# Patient Record
Sex: FEMALE | Race: WHITE | Employment: UNEMPLOYED | URBAN - METROPOLITAN AREA
[De-identification: names, ages, dates, MRNs, and addresses within clinical notes are randomized per-mention and may not be internally consistent; named-entity substitution may affect disease eponyms.]

---

## 2024-03-04 ASSESSMENT — PATIENT HEALTH QUESTIONNAIRE - PHQ9
8. MOVING OR SPEAKING SO SLOWLY THAT OTHER PEOPLE COULD HAVE NOTICED. OR THE OPPOSITE, BEING SO FIGETY OR RESTLESS THAT YOU HAVE BEEN MOVING AROUND A LOT MORE THAN USUAL: 0
7. TROUBLE CONCENTRATING ON THINGS, SUCH AS READING THE NEWSPAPER OR WATCHING TELEVISION: 2
SUM OF ALL RESPONSES TO PHQ QUESTIONS 1-9: 7
8. MOVING OR SPEAKING SO SLOWLY THAT OTHER PEOPLE COULD HAVE NOTICED. OR THE OPPOSITE - BEING SO FIDGETY OR RESTLESS THAT YOU HAVE BEEN MOVING AROUND A LOT MORE THAN USUAL: NOT AT ALL
2. FEELING DOWN, DEPRESSED OR HOPELESS: 1
6. FEELING BAD ABOUT YOURSELF - OR THAT YOU ARE A FAILURE OR HAVE LET YOURSELF OR YOUR FAMILY DOWN: 1
6. FEELING BAD ABOUT YOURSELF - OR THAT YOU ARE A FAILURE OR HAVE LET YOURSELF OR YOUR FAMILY DOWN: SEVERAL DAYS
4. FEELING TIRED OR HAVING LITTLE ENERGY: MORE THAN HALF THE DAYS
10. IF YOU CHECKED OFF ANY PROBLEMS, HOW DIFFICULT HAVE THESE PROBLEMS MADE IT FOR YOU TO DO YOUR WORK, TAKE CARE OF THINGS AT HOME, OR GET ALONG WITH OTHER PEOPLE: 0
5. POOR APPETITE OR OVEREATING: 1
1. LITTLE INTEREST OR PLEASURE IN DOING THINGS: 0
3. TROUBLE FALLING OR STAYING ASLEEP: NOT AT ALL
10. IF YOU CHECKED OFF ANY PROBLEMS, HOW DIFFICULT HAVE THESE PROBLEMS MADE IT FOR YOU TO DO YOUR WORK, TAKE CARE OF THINGS AT HOME, OR GET ALONG WITH OTHER PEOPLE: NOT DIFFICULT AT ALL
SUM OF ALL RESPONSES TO PHQ QUESTIONS 1-9: 7
SUM OF ALL RESPONSES TO PHQ QUESTIONS 1-9: 7
1. LITTLE INTEREST OR PLEASURE IN DOING THINGS: NOT AT ALL
SUM OF ALL RESPONSES TO PHQ QUESTIONS 1-9: 7
2. FEELING DOWN, DEPRESSED OR HOPELESS: SEVERAL DAYS
7. TROUBLE CONCENTRATING ON THINGS, SUCH AS READING THE NEWSPAPER OR WATCHING TELEVISION: MORE THAN HALF THE DAYS
5. POOR APPETITE OR OVEREATING: SEVERAL DAYS
3. TROUBLE FALLING OR STAYING ASLEEP: 0
4. FEELING TIRED OR HAVING LITTLE ENERGY: 2
9. THOUGHTS THAT YOU WOULD BE BETTER OFF DEAD, OR OF HURTING YOURSELF: NOT AT ALL
9. THOUGHTS THAT YOU WOULD BE BETTER OFF DEAD, OR OF HURTING YOURSELF: 0
SUM OF ALL RESPONSES TO PHQ9 QUESTIONS 1 & 2: 1
SUM OF ALL RESPONSES TO PHQ QUESTIONS 1-9: 7

## 2024-03-05 ENCOUNTER — OFFICE VISIT (OUTPATIENT)
Dept: OBGYN CLINIC | Age: 24
End: 2024-03-05
Payer: COMMERCIAL

## 2024-03-05 ENCOUNTER — HOSPITAL ENCOUNTER (OUTPATIENT)
Age: 24
Setting detail: SPECIMEN
Discharge: HOME | End: 2024-03-05

## 2024-03-05 VITALS
DIASTOLIC BLOOD PRESSURE: 60 MMHG | BODY MASS INDEX: 18 KG/M2 | WEIGHT: 112 LBS | HEIGHT: 66 IN | SYSTOLIC BLOOD PRESSURE: 112 MMHG

## 2024-03-05 DIAGNOSIS — N93.9 VAGINAL BLEEDING: ICD-10-CM

## 2024-03-05 DIAGNOSIS — N91.2 AMENORRHEA: ICD-10-CM

## 2024-03-05 DIAGNOSIS — N92.6 MISSED MENSES: ICD-10-CM

## 2024-03-05 DIAGNOSIS — Z86.19 HISTORY OF HERPES SIMPLEX INFECTION: ICD-10-CM

## 2024-03-05 DIAGNOSIS — N91.2 AMENORRHEA: Primary | ICD-10-CM

## 2024-03-05 DIAGNOSIS — F32.A ANXIETY AND DEPRESSION: ICD-10-CM

## 2024-03-05 DIAGNOSIS — F41.9 ANXIETY AND DEPRESSION: ICD-10-CM

## 2024-03-05 LAB
AMPHET UR QL SCN: NEGATIVE
BARBITURATES UR QL SCN: NEGATIVE
BENZODIAZ UR QL: NEGATIVE
CANNABINOIDS UR QL SCN: NEGATIVE
COCAINE UR QL SCN: NEGATIVE
FENTANYL UR QL: NEGATIVE
METHADONE UR QL: NEGATIVE
OPIATES UR QL SCN: NEGATIVE
OXYCODONE UR QL SCN: NEGATIVE
PCP UR QL SCN: NEGATIVE
TEST INFORMATION: NORMAL

## 2024-03-05 PROCEDURE — G8427 DOCREV CUR MEDS BY ELIG CLIN: HCPCS | Performed by: NURSE PRACTITIONER

## 2024-03-05 PROCEDURE — 1036F TOBACCO NON-USER: CPT | Performed by: NURSE PRACTITIONER

## 2024-03-05 PROCEDURE — G8484 FLU IMMUNIZE NO ADMIN: HCPCS | Performed by: NURSE PRACTITIONER

## 2024-03-05 PROCEDURE — 99214 OFFICE O/P EST MOD 30 MIN: CPT | Performed by: NURSE PRACTITIONER

## 2024-03-05 PROCEDURE — 81025 URINE PREGNANCY TEST: CPT | Performed by: NURSE PRACTITIONER

## 2024-03-05 PROCEDURE — G8419 CALC BMI OUT NRM PARAM NOF/U: HCPCS | Performed by: NURSE PRACTITIONER

## 2024-03-05 ASSESSMENT — ANXIETY QUESTIONNAIRES
7. FEELING AFRAID AS IF SOMETHING AWFUL MIGHT HAPPEN: 3
5. BEING SO RESTLESS THAT IT IS HARD TO SIT STILL: 1
GAD7 TOTAL SCORE: 14
2. NOT BEING ABLE TO STOP OR CONTROL WORRYING: 2
1. FEELING NERVOUS, ANXIOUS, OR ON EDGE: 3
IF YOU CHECKED OFF ANY PROBLEMS ON THIS QUESTIONNAIRE, HOW DIFFICULT HAVE THESE PROBLEMS MADE IT FOR YOU TO DO YOUR WORK, TAKE CARE OF THINGS AT HOME, OR GET ALONG WITH OTHER PEOPLE: SOMEWHAT DIFFICULT
6. BECOMING EASILY ANNOYED OR IRRITABLE: 2
4. TROUBLE RELAXING: 1
3. WORRYING TOO MUCH ABOUT DIFFERENT THINGS: 2

## 2024-03-05 NOTE — PROGRESS NOTES
[facility] [address]  [place] [id number]  Dept: [phone]    Missed Menses Intake    Subjective:    Patient Name:Venu Ramsey  Patient Age: 23 y.o.  Date of Visit: 3/5/2024    Venu Ramsey arrives for missed menses visit.   Venu Ramsey had a positive pregnancy test   Venu Ramsey does not have concerns.   Planned Pregnancy: Yes  Partner/FOB name: Josh  No LMP recorded., Regular menses: Yes  Estimate gestation age of LMP: 8w1d  Estimated due date based on LMP: 10/14/24  Patient Occupation: n/a    OBGYN History:   Date of Last Pap Smear:  normal   Number of Pregnancies: 2  Number of Live Births: 1  [x] Vaginal Birth  []  Birth  [] Vaginal Birth after  delivery ()  [] History of High Risk Pregnancy(ies)  [] History of Birth Complications  [] Hx of infant with NICU stay    Past Medical History  Diabetes: No  Anxiety: Yes  Depression: Yes  Bipolar: No  High Blood Pressure:No  Stroke: No  Seizure: No  Deep Vein Thrombosis: No  Preeclampsia: No  Gestational Diabetes:No  Gestational Hypertension:No  Postpartum Hemorrhage: No  Bleeding Disorder: No  Sexually Transmitted Infections: No  Genital Herpes: Yes, only cold sores  History of chicken pox/varicella vaccine: Yes  Daily Medications: Yes  PNV     Past Family History (first degree relative)  Family history of blood clots: No  Family history of diabetes:No  Family history of factor V: No  Family history (mother/sister) of preeclampsia in a previous pregnancy: No    Early 1 Hour Glucose Screening  [] BMI 30 or greater (if marked, positive screen)  Risk Factors (need more than 1 if BMI less than 30)  [] Physical inactivity   [] First degree relative with diabetes  [] High- risk race or ethnicity (, , ,  American, )  [] Have previously given birth to an infant weighing 4,000g or more  [] 
referral will need placed prior to scheduling.   Reviewed ACOG recommendations for carrier screening- CF, SMA, fragile X, Hemoglobinopathies, and additional carrier screening for [employer] decent recommendations reviewed.:  The test we use includes genetic conditions by which American College of Obstetricians and Gynecologists recommend be screened for which includes Cystic fibrosis, fragile X syndrome, spinal muscular atrophy and thalassemias, but also includes additional carrier screening of disorders for a total of 14, that is backed by American College of Obstetricians and Gynecologists.   Patient was given handouts with decision by OB History visit   - Reviewed routine tests done at new OB visit and patient verbally consents to urine drug screen and HIV screening at New OB Visit  - 1 hour glucola needed at OB history: No  She denies tobacco use. The patient was counseled on the risks of tobacco abuse, both maternal and fetal. She was instructed to stop smoking if currently using tobacco. Morbidity, mortality, and cessation programs were reviewed. The risks include but are not limited to increased risks of  labor,  delivery, premature rupture of membranes, intrauterine growth restriction, intrauterine fetal demise and abruptio placenta. Secondary smoke risks were also reviewed. Increases in cancer, respiratory problems, and sudden infant death syndrome were reviewed as well.  - She was instructed to call with any concerns or worsening of any symptoms  - She will return for New OB intake visit  High Risk Factors Identified for this pregnancy:   Hx anxiety/depression  - Elevated EPDS 21 and KALA 7- 14 3/5/2024   - no medications, no longer seeing therapist  - referrals provided 3/5/2024   -Denies any  suicidal ideations at this time had noted thought about it hardly ever but states that was years ago not now or recently Denies any suicidal/homicidal ideatiosn or plans plans or delusions or

## 2024-03-06 LAB
CANDIDA SPECIES: NEGATIVE
CHLAMYDIA DNA UR QL NAA+PROBE: NEGATIVE
GARDNERELLA VAGINALIS: NEGATIVE
N GONORRHOEA DNA UR QL NAA+PROBE: NEGATIVE
SOURCE: NORMAL
SPECIMEN DESCRIPTION: NORMAL
TRICHOMONAS: NEGATIVE

## 2024-03-18 NOTE — PROGRESS NOTES
Patient or partner has Hepatitis C No     Live with Someone with or Exposed to TB? Yes FOB NEG TB TEST    History of STD/GC/Chlamydia/HPV/Syphilis? No     Patient or Partner has Hx of Genital Herpes? No     History of Chickenpox No     History of MRSA No     Risk of Toxoplasmosis Yes 3 CATS    Risk of CMV No     List of other infections      Rash or Viral Illness Since LMP? No     Additional comments      Patient or partner has Hepatitis B No         Previous Birth History:   Vaginal Birth: yes   Birth: no  Any previous birth complications: yes-CIRCUMVALLATE PLACENTA  History of hemorrhage during/after birth: no  History of bleeding disorders: No    High Risk Factor Screening for this Pregnancy:  Age greater than 35 at delivery: No  History of  delivery: no  History of bleeding disorders: No  Objection to receiving blood products: No  History of diabetes (gestational or outside of pregnancy): No, On medications: No  Screening for pregestational diabetes:   BMI greater than 30: No. If Yes, need early glucola  BMI greater than 25 ( Americans greater than 23): No If Yes, need 1 more risk factor.   Physical inactivity: No  First-degree relative with diabetes: No  High-risk race of ethnicity (eg, , , ,  American, ): No  Previously given birth to an infant weighing 3ynp85os (4000g) or more: No  History of hypertension: No  HDL < 35mg/dL and/or a triglyceride level greater than 250 mg/dL: No HDL 50  23  History of polycystic ovarian syndrome: No  A1c greater than or equal to 5.7%: NA    Assessment/Plan:    No pregnancy checklist tasks were completed during this visit, and no tasks are pending completion.       Pregnancy at 10 WEEKS 1 DAY   She was counseled on office policies. She was educated about the anticipated course of prenatal care and routine prenatal labs.   Patient has consented to HIV testing and urine drug screen:

## 2024-03-19 ENCOUNTER — INITIAL PRENATAL (OUTPATIENT)
Dept: OBGYN CLINIC | Age: 24
End: 2024-03-19
Payer: COMMERCIAL

## 2024-03-19 ENCOUNTER — PATIENT MESSAGE (OUTPATIENT)
Dept: PRIMARY CARE CLINIC | Age: 24
End: 2024-03-19

## 2024-03-19 ENCOUNTER — PATIENT MESSAGE (OUTPATIENT)
Dept: OBGYN CLINIC | Age: 24
End: 2024-03-19

## 2024-03-19 VITALS
WEIGHT: 113.2 LBS | SYSTOLIC BLOOD PRESSURE: 110 MMHG | BODY MASS INDEX: 18.55 KG/M2 | HEART RATE: 100 BPM | DIASTOLIC BLOOD PRESSURE: 62 MMHG

## 2024-03-19 DIAGNOSIS — Z3A.10 10 WEEKS GESTATION OF PREGNANCY: Primary | ICD-10-CM

## 2024-03-19 DIAGNOSIS — B00.1 COLD SORE: Primary | ICD-10-CM

## 2024-03-19 PROCEDURE — G8427 DOCREV CUR MEDS BY ELIG CLIN: HCPCS | Performed by: NURSE PRACTITIONER

## 2024-03-19 PROCEDURE — 0500F INITIAL PRENATAL CARE VISIT: CPT | Performed by: NURSE PRACTITIONER

## 2024-03-19 PROCEDURE — H1000 PRENATAL CARE ATRISK ASSESSM: HCPCS | Performed by: NURSE PRACTITIONER

## 2024-03-19 PROCEDURE — G8420 CALC BMI NORM PARAMETERS: HCPCS | Performed by: NURSE PRACTITIONER

## 2024-03-19 SDOH — ECONOMIC STABILITY: INCOME INSECURITY: IN THE LAST 12 MONTHS, WAS THERE A TIME WHEN YOU WERE NOT ABLE TO PAY THE MORTGAGE OR RENT ON TIME?: NO

## 2024-03-19 SDOH — ECONOMIC STABILITY: TRANSPORTATION INSECURITY
IN THE PAST 12 MONTHS, HAS THE LACK OF TRANSPORTATION KEPT YOU FROM MEDICAL APPOINTMENTS OR FROM GETTING MEDICATIONS?: NO

## 2024-03-19 SDOH — ECONOMIC STABILITY: HOUSING INSECURITY
IN THE LAST 12 MONTHS, WAS THERE A TIME WHEN YOU DID NOT HAVE A STEADY PLACE TO SLEEP OR SLEPT IN A SHELTER (INCLUDING NOW)?: NO

## 2024-03-19 SDOH — ECONOMIC STABILITY: FOOD INSECURITY: WITHIN THE PAST 12 MONTHS, YOU WORRIED THAT YOUR FOOD WOULD RUN OUT BEFORE YOU GOT MONEY TO BUY MORE.: NEVER TRUE

## 2024-03-19 SDOH — ECONOMIC STABILITY: FOOD INSECURITY: WITHIN THE PAST 12 MONTHS, THE FOOD YOU BOUGHT JUST DIDN'T LAST AND YOU DIDN'T HAVE MONEY TO GET MORE.: NEVER TRUE

## 2024-03-19 SDOH — ECONOMIC STABILITY: TRANSPORTATION INSECURITY
IN THE PAST 12 MONTHS, HAS LACK OF TRANSPORTATION KEPT YOU FROM MEETINGS, WORK, OR FROM GETTING THINGS NEEDED FOR DAILY LIVING?: NO

## 2024-03-19 SDOH — ECONOMIC STABILITY: HOUSING INSECURITY: IN THE LAST 12 MONTHS, HOW MANY PLACES HAVE YOU LIVED?: 1

## 2024-03-19 ASSESSMENT — SOCIAL DETERMINANTS OF HEALTH (SDOH)
WITHIN THE LAST YEAR, HAVE YOU BEEN AFRAID OF YOUR PARTNER OR EX-PARTNER?: NO
HOW HARD IS IT FOR YOU TO PAY FOR THE VERY BASICS LIKE FOOD, HOUSING, MEDICAL CARE, AND HEATING?: NOT HARD AT ALL
WITHIN THE LAST YEAR, HAVE TO BEEN RAPED OR FORCED TO HAVE ANY KIND OF SEXUAL ACTIVITY BY YOUR PARTNER OR EX-PARTNER?: NO
WITHIN THE LAST YEAR, HAVE YOU BEEN HUMILIATED OR EMOTIONALLY ABUSED IN OTHER WAYS BY YOUR PARTNER OR EX-PARTNER?: NO
WITHIN THE LAST YEAR, HAVE YOU BEEN KICKED, HIT, SLAPPED, OR OTHERWISE PHYSICALLY HURT BY YOUR PARTNER OR EX-PARTNER?: NO

## 2024-03-19 ASSESSMENT — ANXIETY QUESTIONNAIRES
6. BECOMING EASILY ANNOYED OR IRRITABLE: SEVERAL DAYS
GAD7 TOTAL SCORE: 5
IF YOU CHECKED OFF ANY PROBLEMS ON THIS QUESTIONNAIRE, HOW DIFFICULT HAVE THESE PROBLEMS MADE IT FOR YOU TO DO YOUR WORK, TAKE CARE OF THINGS AT HOME, OR GET ALONG WITH OTHER PEOPLE: NOT DIFFICULT AT ALL
1. FEELING NERVOUS, ANXIOUS, OR ON EDGE: SEVERAL DAYS
3. WORRYING TOO MUCH ABOUT DIFFERENT THINGS: SEVERAL DAYS
7. FEELING AFRAID AS IF SOMETHING AWFUL MIGHT HAPPEN: NOT AT ALL
4. TROUBLE RELAXING: SEVERAL DAYS
2. NOT BEING ABLE TO STOP OR CONTROL WORRYING: SEVERAL DAYS
5. BEING SO RESTLESS THAT IT IS HARD TO SIT STILL: NOT AT ALL

## 2024-03-20 ENCOUNTER — E-VISIT (OUTPATIENT)
Dept: PRIMARY CARE CLINIC | Age: 24
End: 2024-03-20
Payer: COMMERCIAL

## 2024-03-20 DIAGNOSIS — B00.1 COLD SORE: Primary | ICD-10-CM

## 2024-03-20 PROCEDURE — 99421 OL DIG E/M SVC 5-10 MIN: CPT | Performed by: NURSE PRACTITIONER

## 2024-03-20 NOTE — TELEPHONE ENCOUNTER
From: Venu Ramsey  To: Cindi Johnson  Sent: 3/19/2024 5:06 PM EDT  Subject: Valtrex    Hello, I think I am getting a cold sore and was wanting a prescription for valycyclover. A lot of them if possible so that I don't have to ask for a new prescription every time I get one amlnd they can last me awhile. Thank you.

## 2024-03-21 RX ORDER — VALACYCLOVIR HYDROCHLORIDE 1 G/1
1000 TABLET, FILM COATED ORAL DAILY
Qty: 5 TABLET | Refills: 0 | Status: SHIPPED | OUTPATIENT
Start: 2024-03-21 | End: 2024-03-21

## 2024-03-21 RX ORDER — VALACYCLOVIR HYDROCHLORIDE 1 G/1
1000 TABLET, FILM COATED ORAL 2 TIMES DAILY
Qty: 5 TABLET | Refills: 3 | Status: SHIPPED | OUTPATIENT
Start: 2024-03-21

## 2024-03-21 NOTE — TELEPHONE ENCOUNTER
From: Venu Ramsey  To: Chela Bonilla  Sent: 3/19/2024 5:05 PM EDT  Subject: Valtrex    Hello, I think I am starting to get a cold sore amd was wanting a prescription for valycyclover. Preferably a lot of them if possible as it makes it easier when I do get a cold sore to already have them as opposed to asking for more every time I get one, thank you.

## 2024-03-21 NOTE — PROGRESS NOTES
Venu Ramsey (2000) initiated an asynchronous digital communication through [employer].    HPI: per patient questionnaire     Exam: not applicable    Diagnoses and all orders for this visit:    Cold sore  New, recommend discussing with OB prior to treatment, pt made aware. Valtrex rx on standby pending OB approval.     -     Discontinue: valACYclovir (VALTREX) 1 g tablet; Take 1 tablet by mouth daily for 5 days      Time: EV1 - 5-10 minutes were spent on the digital evaluation and management of this patient.    Cindi Johnson, PATRICIA - CNP

## 2024-04-16 ENCOUNTER — ROUTINE PRENATAL (OUTPATIENT)
Dept: OBGYN CLINIC | Age: 24
End: 2024-04-16

## 2024-04-16 VITALS — BODY MASS INDEX: 18.68 KG/M2 | WEIGHT: 114 LBS | SYSTOLIC BLOOD PRESSURE: 118 MMHG | DIASTOLIC BLOOD PRESSURE: 70 MMHG

## 2024-04-16 DIAGNOSIS — Z3A.14 14 WEEKS GESTATION OF PREGNANCY: ICD-10-CM

## 2024-04-16 DIAGNOSIS — Z34.90 NORMAL REPEAT PREGNANCY, ANTEPARTUM: Primary | ICD-10-CM

## 2024-04-16 PROCEDURE — 0502F SUBSEQUENT PRENATAL CARE: CPT | Performed by: ADVANCED PRACTICE MIDWIFE

## 2024-04-16 NOTE — PROGRESS NOTES
SUBJECTIVE:    Venu is here for her return OB visit. denies concerns today.   She denies  feeling fetal movement.  She denies vaginal bleeding.  She denies vaginal discharge.  She denies leaking of fluid.  She denies uterine cramping.  She denies  nausea and/or vomiting.    OBJECTIVE:  Blood pressure 118/70, weight 51.7 kg (114 lb), last menstrual period 01/08/2024, not currently breastfeeding.    Total weight gain: 0 kg (0 lb)      ASSESSMENT/PLAN:  IUP @ 14+1 weeks  S=D    Normal Repeat Pregnancy  Due date is based on LMP and confirmed with 10w0d early dating ultrasound  Patient's prenatal labs are not completed.  Patient's blood type A positive and rhogam is not indicated in the pregnancy.   Early glucola indicated: no  Patient Declined genetic screening.   Anatomy scan scheduled 5/28/24   Total weight gain in pregnancy reviewed: Yes      2. 14 weeks gestation of pregnancy  - reviewed warning signs         She was counseled regarding all of the above:    Return in about 4 weeks (around 5/14/2024) for Return OB.    The patient, Venu Ramsey was seen for 25 minutes were spent on this encounter on the date of service by the provider.         Electronically Signed By: PATRICIA Quesada CNM

## 2024-05-14 ENCOUNTER — ROUTINE PRENATAL (OUTPATIENT)
Dept: OBGYN CLINIC | Age: 24
End: 2024-05-14

## 2024-05-14 ENCOUNTER — HOSPITAL ENCOUNTER (OUTPATIENT)
Age: 24
Setting detail: SPECIMEN
Discharge: HOME | End: 2024-05-14

## 2024-05-14 VITALS — BODY MASS INDEX: 19.83 KG/M2 | WEIGHT: 121 LBS | SYSTOLIC BLOOD PRESSURE: 112 MMHG | DIASTOLIC BLOOD PRESSURE: 60 MMHG

## 2024-05-14 DIAGNOSIS — N93.9 VAGINAL BLEEDING: ICD-10-CM

## 2024-05-14 DIAGNOSIS — Z3A.18 18 WEEKS GESTATION OF PREGNANCY: ICD-10-CM

## 2024-05-14 DIAGNOSIS — Z34.90 NORMAL REPEAT PREGNANCY, ANTEPARTUM: Primary | ICD-10-CM

## 2024-05-14 DIAGNOSIS — Z3A.10 10 WEEKS GESTATION OF PREGNANCY: ICD-10-CM

## 2024-05-14 DIAGNOSIS — N92.6 MISSED MENSES: ICD-10-CM

## 2024-05-14 LAB
ABO + RH BLD: NORMAL
BASOPHILS # BLD: 0.05 K/UL (ref 0–0.2)
BASOPHILS NFR BLD: 1 % (ref 0–2)
BILIRUB UR QL STRIP: NEGATIVE
BLOOD GROUP ANTIBODIES SERPL: NEGATIVE
CLARITY UR: CLEAR
COLOR UR: YELLOW
COMMENT: NORMAL
EOSINOPHIL # BLD: 0.17 K/UL (ref 0–0.44)
EOSINOPHILS RELATIVE PERCENT: 2 % (ref 1–4)
ERYTHROCYTE [DISTWIDTH] IN BLOOD BY AUTOMATED COUNT: 12.8 % (ref 11.8–14.4)
GLUCOSE UR STRIP-MCNC: NEGATIVE MG/DL
HBV SURFACE AG SERPL QL IA: NONREACTIVE
HCT VFR BLD AUTO: 39.9 % (ref 36.3–47.1)
HCV AB SERPL QL IA: NONREACTIVE
HGB BLD-MCNC: 12.6 G/DL (ref 11.9–15.1)
HGB UR QL STRIP.AUTO: NEGATIVE
HIV 1+2 AB+HIV1 P24 AG SERPL QL IA: NONREACTIVE
IMM GRANULOCYTES # BLD AUTO: 0.07 K/UL (ref 0–0.3)
IMM GRANULOCYTES NFR BLD: 1 %
KETONES UR STRIP-MCNC: NEGATIVE MG/DL
LEUKOCYTE ESTERASE UR QL STRIP: NEGATIVE
LYMPHOCYTES NFR BLD: 2.5 K/UL (ref 1.1–3.7)
LYMPHOCYTES RELATIVE PERCENT: 24 % (ref 24–43)
MCH RBC QN AUTO: 31.1 PG (ref 25.2–33.5)
MCHC RBC AUTO-ENTMCNC: 31.6 G/DL (ref 28.4–34.8)
MCV RBC AUTO: 98.5 FL (ref 82.6–102.9)
MONOCYTES NFR BLD: 0.85 K/UL (ref 0.1–1.2)
MONOCYTES NFR BLD: 8 % (ref 3–12)
NEUTROPHILS NFR BLD: 64 % (ref 36–65)
NEUTS SEG NFR BLD: 6.84 K/UL (ref 1.5–8.1)
NITRITE UR QL STRIP: NEGATIVE
NRBC BLD-RTO: 0 PER 100 WBC
PH UR STRIP: 7.5 [PH] (ref 5–8)
PLATELET # BLD AUTO: 204 K/UL (ref 138–453)
PMV BLD AUTO: 11.5 FL (ref 8.1–13.5)
PROT UR STRIP-MCNC: NEGATIVE MG/DL
RBC # BLD AUTO: 4.05 M/UL (ref 3.95–5.11)
RUBV IGG SERPL QL IA: 83.3 IU/ML
SP GR UR STRIP: 1 (ref 1–1.03)
T PALLIDUM AB SER QL IA: NONREACTIVE
UROBILINOGEN UR STRIP-ACNC: NORMAL EU/DL (ref 0–1)
WBC OTHER # BLD: 10.5 K/UL (ref 3.5–11.3)

## 2024-05-14 PROCEDURE — 0502F SUBSEQUENT PRENATAL CARE: CPT | Performed by: ADVANCED PRACTICE MIDWIFE

## 2024-05-14 NOTE — PROGRESS NOTES
SUBJECTIVE:    Venu is here for her return OB visit. denies concerns today.   She reports  feeling fetal movement.  She denies vaginal bleeding.  She denies vaginal discharge.  She denies leaking of fluid.  She denies uterine cramping.  She denies  nausea and/or vomiting.    OBJECTIVE:  Blood pressure 112/60, weight 54.9 kg (121 lb), last menstrual period 01/08/2024, not currently breastfeeding.    Total weight gain: 3.175 kg (7 lb)        ASSESSMENT/PLAN:  IUP @ 18+1 weeks  S=D    Normal Repeat Pregnancy  Due date is based on LMP and confirmed with 10w0d early dating ultrasound  Patient's prenatal labs are not completed.  Patient's blood type A positive and rhogam is not indicated in the pregnancy.   Early glucola indicated: no  Patient Declined genetic screening.   Anatomy scan scheduled 5/28/24   Total weight gain in pregnancy reviewed: Yes  Fetal movement was reviewed.      2. 18 weeks gestation of pregnancy  - Reviewed warning sings  - Reviewed prenatal labs and reports is going to do after her appointment today        She was counseled regarding all of the above:    Return in about 4 weeks (around 6/11/2024) for Return OB.    The patient, Venu Ramsey was seen for 25 minutes were spent on this encounter on the date of service by the provider.       Electronically Signed By: PATRICIA Quesada CNM

## 2024-05-15 LAB
CHLAMYDIA DNA UR QL NAA+PROBE: NEGATIVE
MICROORGANISM SPEC CULT: NORMAL
N GONORRHOEA DNA UR QL NAA+PROBE: NEGATIVE
SPECIMEN DESCRIPTION: NORMAL
SPECIMEN DESCRIPTION: NORMAL
VZV IGG SER QL IA: 1.1

## 2024-05-15 RX ORDER — VALACYCLOVIR HYDROCHLORIDE 1 G/1
1000 TABLET, FILM COATED ORAL 2 TIMES DAILY
Qty: 5 TABLET | Refills: 3 | Status: SHIPPED | OUTPATIENT
Start: 2024-05-15

## 2024-05-15 NOTE — TELEPHONE ENCOUNTER
Pt is only using this for outbreaks for her cold sores- being pregnant she gets them frequently and was wondering if she can either have refills or have a 30 day supply

## 2024-05-28 ENCOUNTER — ROUTINE PRENATAL (OUTPATIENT)
Dept: PERINATAL CARE | Age: 24
End: 2024-05-28
Payer: COMMERCIAL

## 2024-05-28 VITALS
HEIGHT: 66 IN | HEART RATE: 92 BPM | SYSTOLIC BLOOD PRESSURE: 110 MMHG | TEMPERATURE: 97.3 F | RESPIRATION RATE: 16 BRPM | DIASTOLIC BLOOD PRESSURE: 68 MMHG | BODY MASS INDEX: 19.93 KG/M2 | WEIGHT: 124 LBS

## 2024-05-28 DIAGNOSIS — O44.22 PLACENTA MARGINALIS IN SECOND TRIMESTER: Primary | ICD-10-CM

## 2024-05-28 DIAGNOSIS — O26.12 LOW WEIGHT GAIN DURING PREGNANCY IN SECOND TRIMESTER: ICD-10-CM

## 2024-05-28 DIAGNOSIS — Z36.86 ENCOUNTER FOR SCREENING FOR RISK OF PRE-TERM LABOR: ICD-10-CM

## 2024-05-28 DIAGNOSIS — Z3A.20 20 WEEKS GESTATION OF PREGNANCY: ICD-10-CM

## 2024-05-28 DIAGNOSIS — O44.40 LOW IMPLANTATION OF PLACENTA WITHOUT HEMORRHAGE: ICD-10-CM

## 2024-05-28 DIAGNOSIS — O09.899 SHORT INTERVAL BETWEEN PREGNANCIES COMPLICATING PREGNANCY, ANTEPARTUM: ICD-10-CM

## 2024-05-28 PROCEDURE — 76817 TRANSVAGINAL US OBSTETRIC: CPT | Performed by: OBSTETRICS & GYNECOLOGY

## 2024-05-28 PROCEDURE — G8420 CALC BMI NORM PARAMETERS: HCPCS | Performed by: OBSTETRICS & GYNECOLOGY

## 2024-05-28 PROCEDURE — G8427 DOCREV CUR MEDS BY ELIG CLIN: HCPCS | Performed by: OBSTETRICS & GYNECOLOGY

## 2024-05-28 PROCEDURE — 99244 OFF/OP CNSLTJ NEW/EST MOD 40: CPT | Performed by: OBSTETRICS & GYNECOLOGY

## 2024-05-28 PROCEDURE — 76811 OB US DETAILED SNGL FETUS: CPT | Performed by: OBSTETRICS & GYNECOLOGY

## 2024-06-12 ENCOUNTER — ROUTINE PRENATAL (OUTPATIENT)
Dept: OBGYN CLINIC | Age: 24
End: 2024-06-12

## 2024-06-12 VITALS
BODY MASS INDEX: 20.89 KG/M2 | SYSTOLIC BLOOD PRESSURE: 124 MMHG | WEIGHT: 130 LBS | HEIGHT: 66 IN | DIASTOLIC BLOOD PRESSURE: 72 MMHG

## 2024-06-12 DIAGNOSIS — O43.199 MARGINAL INSERTION OF UMBILICAL CORD AFFECTING MANAGEMENT OF MOTHER: ICD-10-CM

## 2024-06-12 DIAGNOSIS — Z3A.22 22 WEEKS GESTATION OF PREGNANCY: ICD-10-CM

## 2024-06-12 DIAGNOSIS — Z34.90 NORMAL REPEAT PREGNANCY, ANTEPARTUM: Primary | ICD-10-CM

## 2024-06-12 DIAGNOSIS — O44.40 LOW-LYING PLACENTA: ICD-10-CM

## 2024-06-12 PROCEDURE — 0502F SUBSEQUENT PRENATAL CARE: CPT | Performed by: ADVANCED PRACTICE MIDWIFE

## 2024-06-12 NOTE — PROGRESS NOTES
SUBJECTIVE:    Venu is here for her return OB visit. denies concerns today.   She reports  feeling fetal movement.  She denies vaginal bleeding.  She denies vaginal discharge.  She denies leaking of fluid.  She denies uterine cramping.  She denies  nausea and/or vomiting.    OBJECTIVE:  Blood pressure 124/72, height 1.664 m (5' 5.5\"), weight 59 kg (130 lb), last menstrual period 2024, not currently breastfeeding.    Total weight gain: 7.258 kg (16 lb)      ASSESSMENT/PLAN:  IUP @ 22+2 weeks  S=D    High Risk Pregnancy  Due date is based on LMP and confirmed with 10w0d early dating ultrasound  Patient's prenatal labs are  completed.  Patient's blood type A positive and rhogam is not indicated in the pregnancy.   Early glucola indicated: no  Patient Declined genetic screening.   Anatomy scan scheduled 24 completed. Placenta posterior,normal cord insertion: NO Marginal Cord Insertion, cervical length 3.64 cm, low lying placenta PRESENT  and 23.1mm from cervical os, no placenta previa . F/up scheduled 24  Second trimester 24-28 week labs:   [x] Ordered 2024 PATRICIA Quesada - KEILA   Total weight gain in pregnancy reviewed: Yes  Fetal movement was reviewed.  Reviewed signs and symptoms of  labor: Yes  Reviewed signs and symptoms of preeclampsia: Yes  Reviewed signs and symptoms of stephanie hick contractions:  Yes    2. 22 weeks gestation of pregnancy  - CBC; Future  - Culture, Urine; Future  - Glucose tolerance, 1 hour; Future    3. Marginal insertion of umbilical cord affecting management of mother  - Reviewed findings on anatomy scan     4. Low-lying placenta  - Reviewed findings on anatomy scan and recommended f/up. USN with M scheduled.         She was counseled regarding all of the above:    Return in about 4 weeks (around 7/10/2024) for Return OB.    The patient, Venu Ramsey was seen for 25 minutes were spent on this encounter on the date of service by the provider.

## 2024-06-12 NOTE — PROGRESS NOTES
Chaperone for Intimate Exam  Chaperone was offered as part of the rooming process. Patient declined and agrees to continue with exam without a chaperone.  Chaperone: n.a

## 2024-07-11 DIAGNOSIS — B00.1 COLD SORE: Primary | ICD-10-CM

## 2024-07-11 RX ORDER — VALACYCLOVIR HYDROCHLORIDE 1 G/1
1000 TABLET, FILM COATED ORAL 2 TIMES DAILY
Qty: 30 TABLET | Refills: 0 | Status: SHIPPED | OUTPATIENT
Start: 2024-07-11

## 2024-07-11 NOTE — TELEPHONE ENCOUNTER
Medication Request/Refill Telephone Documentation:  Medication Requested: valtrex   Provider last filled by: marquis  Last visit: 6/12/24  Last annual/pap smear: 3/23/23  NEXT APPT:7/17/24

## 2024-07-16 ENCOUNTER — ROUTINE PRENATAL (OUTPATIENT)
Dept: OBGYN CLINIC | Age: 24
End: 2024-07-16

## 2024-07-16 VITALS — BODY MASS INDEX: 22.78 KG/M2 | SYSTOLIC BLOOD PRESSURE: 102 MMHG | WEIGHT: 139 LBS | DIASTOLIC BLOOD PRESSURE: 64 MMHG

## 2024-07-16 DIAGNOSIS — O43.199 MARGINAL INSERTION OF UMBILICAL CORD AFFECTING MANAGEMENT OF MOTHER: ICD-10-CM

## 2024-07-16 DIAGNOSIS — O09.90 HIGH RISK PREGNANCY, ANTEPARTUM: Primary | ICD-10-CM

## 2024-07-16 DIAGNOSIS — O44.40 LOW-LYING PLACENTA: ICD-10-CM

## 2024-07-16 DIAGNOSIS — R12 HEARTBURN DURING PREGNANCY, ANTEPARTUM: ICD-10-CM

## 2024-07-16 DIAGNOSIS — O26.899 HEARTBURN DURING PREGNANCY, ANTEPARTUM: ICD-10-CM

## 2024-07-16 DIAGNOSIS — Z3A.27 27 WEEKS GESTATION OF PREGNANCY: ICD-10-CM

## 2024-07-16 PROCEDURE — 0502F SUBSEQUENT PRENATAL CARE: CPT | Performed by: NURSE PRACTITIONER

## 2024-07-16 RX ORDER — OMEPRAZOLE 20 MG/1
20 CAPSULE, DELAYED RELEASE ORAL
Qty: 30 CAPSULE | Refills: 3 | Status: SHIPPED | OUTPATIENT
Start: 2024-07-16

## 2024-07-16 NOTE — PATIENT INSTRUCTIONS
After Hours Number: You can call the office [phone]  or [phone]  Call if you have:  1.  Bleeding like a period  2.  Cramps or contractions greater than 2 hours  3.  If you are leaking fluid  4.  If you've a fever greater than 100°  5.  If you feel as if baby is not moving  6. If you have continuous vomiting over 3-4 hours   Counting Your Baby's Kicks: Care Instructions  Your Care Instructions    Counting your baby's kicks is one way your doctor can tell that your baby is healthy. Most women--especially in a first pregnancy--feel their baby move for the first time between 16 and 22 weeks. The movement may feel like flutters rather than kicks. Your baby may move more at certain times of the day. When you are active, you may notice less kicking than when you are resting. At your prenatal visits, your doctor will ask whether the baby is active.  In your last trimester, your doctor may ask you to count the number of times you feel your baby move.  Follow-up care is a key part of your treatment and safety. Be sure to make and go to all appointments, and call your doctor if you are having problems. It's also a good idea to know your test results and keep a list of the medicines you take.  How do you count fetal kicks?  A common method of checking your baby's movement is to count the number of kicks or moves you feel in 1 hour. Ten movements (such as kicks, flutters, or rolls) in 1 hour are normal. Some doctors suggest that you count in the morning until you get to 10 movements. Then you can quit for that day and start again the next day.  Pick your baby's most active time of day to count. This may be any time from morning to evening.  If you do not feel 10 movements in an hour, your baby may be sleeping. Wait for the next hour and count again.  When should you call for help?  Call your doctor now or seek immediate medical care if:    You noticed that your baby has stopped moving or is moving much less than

## 2024-07-16 NOTE — PROGRESS NOTES
no  Discharge: no   Dysuria: no  Nausea: no  Heartburn: yes - taking max dose tums states took prilosec before and helped declined wanting pepcid  Epigastric pain: no  Contractions: no  Leakage of fluid: no  + fetal movement     1. High risk pregnancy, antepartum      2. 27 weeks gestation of pregnancy  States completing cbc, 1 hr GTT and urine culture next week     3. Low-lying placenta  4. Marginal insertion of umbilical cord affecting management of mother  Marginal Cord Insertion, cervical length 3.64 cm, low lying placenta PRESENT  and 23.1mm from cervical os, no placenta previa .  Follow up 24- she cancelled that follow up now scheduled 24  5. Heartburn during pregnancy, antepartum  Prilosec script sent   - omeprazole (PRILOSEC) 20 MG delayed release capsule; Take 1 capsule by mouth every morning (before breakfast)  Dispense: 30 capsule; Refill: 3       Return 4 WEEKS        PTL signs reviewed, if indicated  Kick Count Instructions given if indicated:  The patient was instructed on fetal kick counts and was given a kick sheet to complete every 8 hours. This is to begin at 28 weeks gestation. She was instructed that the baby should move at a minimum of ten times within one hour after a meal. The patient was instructed to lay down on her left side twenty minutes after eating and count movements for up to one hour with a target value of ten movements.   She was instructed to notify the office if she did not make that target after two attempts or if after any attempt there was less than four movements.  After hour numbers reviewed , along with labor signs if indicated.   Contractions/cramping between 5-7 minutes and persisting even with attempts of increased water and laying on side.   Fluid leakage, bleeding    The patient was counseled on Labor & Delivery.   Route of delivery and counseling on vaginal, operative vaginal, and  sections were completed with the risks of each to both the patient as

## 2024-08-07 ENCOUNTER — ROUTINE PRENATAL (OUTPATIENT)
Dept: OBGYN CLINIC | Age: 24
End: 2024-08-07

## 2024-08-07 VITALS — SYSTOLIC BLOOD PRESSURE: 108 MMHG | WEIGHT: 146 LBS | DIASTOLIC BLOOD PRESSURE: 62 MMHG | BODY MASS INDEX: 23.93 KG/M2

## 2024-08-07 DIAGNOSIS — Z3A.30 30 WEEKS GESTATION OF PREGNANCY: Primary | ICD-10-CM

## 2024-08-07 PROCEDURE — 0502F SUBSEQUENT PRENATAL CARE: CPT | Performed by: OBSTETRICS & GYNECOLOGY

## 2024-08-08 NOTE — PROGRESS NOTES
Reference Range:  <0.91         Not Immune  0.91-1.09     Equivocal  >1.09         Immune        ]    Insurance: [employer]    IPV bag/mug given: 3/19/2024 Lorie Abdi LPN   Genetic Information given/reviewed: 3/5/2024 Chela Bonilla CNP   1st trimester education packet given:  2nd trimester education packet given:3/19/2024 Lorie Abdi LPN   3rd trimester education packet given:      FOB Name: Josh    Last Pap Smear: 3/23/23 negative  [] Urine collected for culture   [] Negative date **   [] Positive date **  [x] UDS collected 3/5/24  [x] Gc/ct collected 3/5/24  [] Prenatal Labs completed **    Genetic Screening:  [x]Declined 3/19/2024 Lorie Abdi LPN       Carrier Screening:  [x] Declined  [x] Known negative CF/SMA/thalassemia/sickle cell in     Baby aspirin screen:  []Positive  [x]Negative    Early 1 hour GTT:  []Yes  [x] No    AFP:  []Ordered  []Completed    Anatomy scan:  [x]Referral Sent 3/19/2024 Lorie Abdi LPN   [x]Scheduled 24 w/ consult    [x]Completed Placenta posterior,normal cord insertion: NO Marginal Cord Insertion, cervical length 3.64 cm, low lying placenta PRESENT  and 23.1mm from cervical os, no placenta previa .  [x] Follow up 24- she cancelled that follow up now scheduled 24    Fetal Echo:   [] Yes  [] No    High Risk Factors:  Hx anxiety/depression  - Elevated EPDS 21 and KALA 7- 14 3/5/2024   - no medications, no longer seeing therapist  - referrals provided 3/5/2024   -Denies any  suicidal ideations at this time had noted thought about it hardly ever but states that was years ago not now or recently Denies any suicidal/homicidal ideatiosn or plans plans or delusions or hallucinations.  Hx HSV  - titers completed in   - recommend valtrex suppression therapy at 36 weeks   Short interval between pregnancies   - vaginal delivery 22  Marginal Cord Insertion  Low Lying Placenta   -23.1mm from os    [x]Placed on High Risk List 3/6/2024 Melissa Garcia

## 2024-08-20 ENCOUNTER — TELEPHONE (OUTPATIENT)
Dept: OBGYN CLINIC | Age: 24
End: 2024-08-20

## 2024-08-20 NOTE — TELEPHONE ENCOUNTER
Pt canceled 32 week appt because she is going to [facility] in the morning and only has 1 day to get errands done and it is too much to do in one day. Writer explained to pt that the MFM appt and OB appt are two different appts and she needs both but pt said she understands that but still wanted to cancel.

## 2024-08-21 ENCOUNTER — ROUTINE PRENATAL (OUTPATIENT)
Dept: PERINATAL CARE | Age: 24
End: 2024-08-21
Payer: COMMERCIAL

## 2024-08-21 VITALS
HEART RATE: 81 BPM | RESPIRATION RATE: 16 BRPM | SYSTOLIC BLOOD PRESSURE: 125 MMHG | HEIGHT: 65 IN | DIASTOLIC BLOOD PRESSURE: 67 MMHG | WEIGHT: 147 LBS | TEMPERATURE: 97.2 F | BODY MASS INDEX: 24.49 KG/M2

## 2024-08-21 DIAGNOSIS — Z3A.32 32 WEEKS GESTATION OF PREGNANCY: ICD-10-CM

## 2024-08-21 DIAGNOSIS — Z03.72 SUSPECTED PLACENTAL PROBLEM NOT FOUND: ICD-10-CM

## 2024-08-21 DIAGNOSIS — O09.899 SHORT INTERVAL BETWEEN PREGNANCIES COMPLICATING PREGNANCY, ANTEPARTUM: ICD-10-CM

## 2024-08-21 DIAGNOSIS — O44.23 PLACENTA MARGINALIS IN THIRD TRIMESTER: Primary | ICD-10-CM

## 2024-08-21 DIAGNOSIS — Z36.4 ULTRASOUND FOR ANTENATAL SCREENING FOR FETAL GROWTH RESTRICTION: ICD-10-CM

## 2024-08-21 DIAGNOSIS — O44.40 LOW IMPLANTATION OF PLACENTA WITHOUT HEMORRHAGE: ICD-10-CM

## 2024-08-21 DIAGNOSIS — Z36.3 ENCOUNTER FOR ROUTINE SCREENING FOR MALFORMATION USING ULTRASONICS: ICD-10-CM

## 2024-08-21 PROCEDURE — 76805 OB US >/= 14 WKS SNGL FETUS: CPT | Performed by: OBSTETRICS & GYNECOLOGY

## 2024-08-21 PROCEDURE — 99999 PR OFFICE/OUTPT VISIT,PROCEDURE ONLY: CPT | Performed by: OBSTETRICS & GYNECOLOGY

## 2024-08-21 PROCEDURE — 76817 TRANSVAGINAL US OBSTETRIC: CPT | Performed by: OBSTETRICS & GYNECOLOGY

## 2024-08-21 PROCEDURE — 76819 FETAL BIOPHYS PROFIL W/O NST: CPT | Performed by: OBSTETRICS & GYNECOLOGY

## 2024-08-21 PROCEDURE — 76820 UMBILICAL ARTERY ECHO: CPT | Performed by: OBSTETRICS & GYNECOLOGY

## 2024-08-21 NOTE — TELEPHONE ENCOUNTER
Okay thank you. If she needs some virtual visits to make things work we can do that as well. As long as she is physically being seen weekly for  testing. Just ensure she understands she still needs prenatal care and seen at this office as well as M is not her obstetric care providers and will not maintain routine OB care.     Dr. Bernal

## 2024-09-04 ENCOUNTER — ROUTINE PRENATAL (OUTPATIENT)
Dept: OBGYN CLINIC | Age: 24
End: 2024-09-04

## 2024-09-04 VITALS — SYSTOLIC BLOOD PRESSURE: 120 MMHG | DIASTOLIC BLOOD PRESSURE: 72 MMHG | WEIGHT: 150 LBS | BODY MASS INDEX: 24.96 KG/M2

## 2024-09-04 DIAGNOSIS — F41.1 GENERALIZED ANXIETY DISORDER: ICD-10-CM

## 2024-09-04 DIAGNOSIS — F33.1 MODERATE RECURRENT MAJOR DEPRESSION (HCC): ICD-10-CM

## 2024-09-04 DIAGNOSIS — O09.893 NONCOMPLIANT PREGNANT PATIENT IN THIRD TRIMESTER: ICD-10-CM

## 2024-09-04 DIAGNOSIS — O43.199 MARGINAL INSERTION OF UMBILICAL CORD AFFECTING MANAGEMENT OF MOTHER: ICD-10-CM

## 2024-09-04 DIAGNOSIS — Z91.199 NONCOMPLIANT PREGNANT PATIENT IN THIRD TRIMESTER: ICD-10-CM

## 2024-09-04 DIAGNOSIS — Z3A.34 34 WEEKS GESTATION OF PREGNANCY: Primary | ICD-10-CM

## 2024-09-04 DIAGNOSIS — O09.93 HIGH-RISK PREGNANCY IN THIRD TRIMESTER: ICD-10-CM
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
## 2024-09-04 PROCEDURE — 0502F SUBSEQUENT PRENATAL CARE: CPT | Performed by: STUDENT IN AN ORGANIZED HEALTH CARE EDUCATION/TRAINING PROGRAM

## 2024-09-04 RX ORDER — LANCETS 30 GAUGE
1 EACH MISCELLANEOUS 4 TIMES DAILY
Qty: 100 EACH | Refills: 2 | Status: SHIPPED | OUTPATIENT
Start: 2024-09-04

## 2024-09-04 RX ORDER — BLOOD-GLUCOSE METER
EACH MISCELLANEOUS
Qty: 1 EACH | Refills: 0 | Status: SHIPPED | OUTPATIENT
Start: 2024-09-04

## 2024-09-04 RX ORDER — ISOPROPYL ALCOHOL 700 MG/ML
CLOTH TOPICAL
Qty: 100 EACH | Refills: 3 | Status: SHIPPED | OUTPATIENT
Start: 2024-09-04

## 2024-09-04 NOTE — PROGRESS NOTES
Prenatal Visit    Venu Ramsey is a 24 y.o. female  at 34w2d    The patient was seen and evaluated. She has no complaints. There was positive fetal movements. She denies contractions, vaginal bleeding and leakage of fluid. Signs and symptoms of  labor as well as labor were reviewed. The S/S of Pre-Eclampsia were reviewed with the patient in detail. She is to report any of these if they occur. She currently denies any of these.    Discussed resident involvement in triage and labor and delivery process. Discussed call group. All questions answered. Patient verbalized understanding and agreement.     Vitals:  /72   Wt 68 kg (150 lb)   LMP 2024   BMI 24.96 kg/m²       Assessment & Plan:  Venu Ramsey is a 24 y.o. female  at 34w2d   - 28 week labs has not completed. Reminded patient again today. Discussed risks of undiagnosed gestational diabetes including abnormal fetal growth, fetal morbidity and mortality, fetal death, increased  blood sugar draws after delivery.   - Influenza and Covid vaccinations recommended per ACOG: R/B/A discussed with increased risk of both maternal and fetal morbidity and mortality in unvaccinated pregnant patients.    - TDAP and RSV vaccinations recommended per ACOG. R/B/A discussed. She understands must received RSV vaccine at pharmacy.   - Continue prenatal vitamin        FOB Name: Josh Chester Pap Smear: 3/23/23 negative  [x] Urine collected for culture   [x] Negative    [] Positive   [x] UDS collected 3/5/24 > negative  [x] Gc/ct collected 3/5/24 > negative  [x] Prenatal Labs completed     Genetic Screening:  [x]Declined 3/19/2024 Lorie Abdi LPN     Carrier Screening:  [x] Declined  [x] Known negative CF/SMA/thalassemia/sickle cell in     Baby aspirin screen:  []Positive  [x]Negative    Early 1 hour GTT:  []Yes  [x] No    AFP: declined  []Ordered  []Completed    Anatomy scan:  [x]Referral Sent 3/19/2024 Lorie Abdi LPN

## 2024-09-05 ENCOUNTER — TELEPHONE (OUTPATIENT)
Dept: OBGYN CLINIC | Age: 24
End: 2024-09-05

## 2024-09-05 NOTE — TELEPHONE ENCOUNTER
Pt is currently 34w3d and has not completed her 1 hr glucose test. She was originally going to check her blood sugars but called to let us know that they are to expensive and she is going to complete the glucose test.

## 2024-09-09 ENCOUNTER — HOSPITAL ENCOUNTER (OUTPATIENT)
Age: 24
Setting detail: SPECIMEN
Discharge: HOME | End: 2024-09-09

## 2024-09-09 DIAGNOSIS — Z3A.22 22 WEEKS GESTATION OF PREGNANCY: ICD-10-CM

## 2024-09-09 LAB
ERYTHROCYTE [DISTWIDTH] IN BLOOD BY AUTOMATED COUNT: 12.3 % (ref 11.8–14.4)
GLUCOSE 1H P 50 G GLC PO SERPL-MCNC: 119 MG/DL (ref 70–135)
GLUCOSE ADMINISTRATION: NORMAL
HCT VFR BLD AUTO: 37.6 % (ref 36.3–47.1)
HGB BLD-MCNC: 11.8 G/DL (ref 11.9–15.1)
MCH RBC QN AUTO: 29.6 PG (ref 25.2–33.5)
MCHC RBC AUTO-ENTMCNC: 31.4 G/DL (ref 28.4–34.8)
MCV RBC AUTO: 94.5 FL (ref 82.6–102.9)
NRBC BLD-RTO: 0 PER 100 WBC
PLATELET # BLD AUTO: 191 K/UL (ref 138–453)
PMV BLD AUTO: 11.2 FL (ref 8.1–13.5)
RBC # BLD AUTO: 3.98 M/UL (ref 3.95–5.11)
WBC OTHER # BLD: 9.9 K/UL (ref 3.5–11.3)

## 2024-09-10 LAB
MICROORGANISM SPEC CULT: NO GROWTH
SERVICE CMNT-IMP: NORMAL
SPECIMEN DESCRIPTION: NORMAL

## 2024-09-18 ENCOUNTER — ROUTINE PRENATAL (OUTPATIENT)
Dept: OBGYN CLINIC | Age: 24
End: 2024-09-18

## 2024-09-18 ENCOUNTER — HOSPITAL ENCOUNTER (OUTPATIENT)
Age: 24
Setting detail: SPECIMEN
Discharge: HOME | End: 2024-09-18

## 2024-09-18 VITALS — WEIGHT: 153 LBS | DIASTOLIC BLOOD PRESSURE: 62 MMHG | BODY MASS INDEX: 25.46 KG/M2 | SYSTOLIC BLOOD PRESSURE: 118 MMHG

## 2024-09-18 DIAGNOSIS — Z3A.36 36 WEEKS GESTATION OF PREGNANCY: ICD-10-CM

## 2024-09-18 DIAGNOSIS — O09.93 HIGH-RISK PREGNANCY IN THIRD TRIMESTER: ICD-10-CM

## 2024-09-18 DIAGNOSIS — Z3A.36 36 WEEKS GESTATION OF PREGNANCY: Primary | ICD-10-CM

## 2024-09-18 DIAGNOSIS — O43.193 MARGINAL INSERTION OF UMBILICAL CORD AFFECTING MANAGEMENT OF MOTHER IN THIRD TRIMESTER: ICD-10-CM

## 2024-09-18 RX ORDER — VALACYCLOVIR HYDROCHLORIDE 500 MG/1
500 TABLET, FILM COATED ORAL 2 TIMES DAILY
Qty: 60 TABLET | Refills: 1 | Status: SHIPPED | OUTPATIENT
Start: 2024-09-18

## 2024-09-21 LAB
MICROORGANISM SPEC CULT: NORMAL
SERVICE CMNT-IMP: NORMAL
SPECIMEN DESCRIPTION: NORMAL

## 2024-09-25 ENCOUNTER — ROUTINE PRENATAL (OUTPATIENT)
Dept: OBGYN CLINIC | Age: 24
End: 2024-09-25

## 2024-09-25 VITALS — BODY MASS INDEX: 25.63 KG/M2 | WEIGHT: 154 LBS | SYSTOLIC BLOOD PRESSURE: 122 MMHG | DIASTOLIC BLOOD PRESSURE: 62 MMHG

## 2024-09-25 DIAGNOSIS — Z3A.37 37 WEEKS GESTATION OF PREGNANCY: Primary | ICD-10-CM

## 2024-09-25 PROCEDURE — 0502F SUBSEQUENT PRENATAL CARE: CPT | Performed by: OBSTETRICS & GYNECOLOGY

## 2024-10-02 ENCOUNTER — ROUTINE PRENATAL (OUTPATIENT)
Dept: OBGYN CLINIC | Age: 24
End: 2024-10-02

## 2024-10-02 VITALS — BODY MASS INDEX: 26.29 KG/M2 | SYSTOLIC BLOOD PRESSURE: 112 MMHG | WEIGHT: 158 LBS | DIASTOLIC BLOOD PRESSURE: 68 MMHG

## 2024-10-02 DIAGNOSIS — Z3A.38 38 WEEKS GESTATION OF PREGNANCY: Primary | ICD-10-CM

## 2024-10-02 PROCEDURE — 0502F SUBSEQUENT PRENATAL CARE: CPT | Performed by: OBSTETRICS & GYNECOLOGY

## 2024-10-03 NOTE — PROGRESS NOTES
Venu Ramsey is a 24 y.o. female 38w3d        OB History    Para Term  AB Living   2 1 1     1   SAB IAB Ectopic Molar Multiple Live Births           0 1      # Outcome Date GA Lbr Maynor/2nd Weight Sex Type Anes PTL Lv   2 Current            1 Term 22 40w4d 02:19 :35 3.55 kg (7 lb 13.2 oz) F Vag-Spont EPI N MARCY       Vitals  BP: 112/68  Weight - Scale: 71.7 kg (158 lb)  Fundal Height (cm): 37 cm  Fetal HR: 150  Movement: Present  Presentation: Vertex      The patient was seen and evaluated. There was positive fetal movements. No contractions or leakage of fluid. Signs and symptoms of labor were reviewed.  The S/S of Pre-Eclampsia were reviewed with the patient in detail. She is to report any of these if they occur. She currently denies any of these.    The patient was instructed on fetal kick counts and was given a kick sheet to complete every 8 hours. She was instructed that the baby should move at a minimum of ten times within one hour after a meal. The patient was instructed to lay down on her left side twenty minutes after eating and count movements for up to one hour with a target value of ten movements.  She was instructed to notify the office if she did not make that target after two attempts or if after any attempt there was less than four movements.    The patient reports that the targets have been made Yes.    10/16/24 IOL 8 PM    Insurance: [employer]    IPV bag/mug given: 3/19/2024 Lorie Adbi LPN   Genetic Information given/reviewed: 3/5/2024 Chela Bonilla CNP   1st trimester education packet given:  2nd trimester education packet given:3/19/2024 Lorie Abdi LPN   3rd trimester education packet given:      FOB Name: Josh    Last Pap Smear: 3/23/23 negative  [x] Urine collected for culture   [x] Negative    [] Positive   [x] UDS collected 3/5/24 > negative  [x] Gc/ct collected 3/5/24 > negative  [x] Prenatal Labs completed     Genetic  No

## 2024-10-08 ENCOUNTER — ROUTINE PRENATAL (OUTPATIENT)
Dept: OBGYN CLINIC | Age: 24
End: 2024-10-08

## 2024-10-08 VITALS — WEIGHT: 158 LBS | SYSTOLIC BLOOD PRESSURE: 116 MMHG | DIASTOLIC BLOOD PRESSURE: 62 MMHG | BODY MASS INDEX: 26.29 KG/M2

## 2024-10-08 DIAGNOSIS — Z3A.39 39 WEEKS GESTATION OF PREGNANCY: ICD-10-CM

## 2024-10-08 DIAGNOSIS — O43.193 MARGINAL INSERTION OF UMBILICAL CORD AFFECTING MANAGEMENT OF MOTHER IN THIRD TRIMESTER: ICD-10-CM

## 2024-10-08 DIAGNOSIS — O09.93 HIGH-RISK PREGNANCY IN THIRD TRIMESTER: Primary | ICD-10-CM

## 2024-10-08 DIAGNOSIS — Z86.19 HISTORY OF HERPES SIMPLEX INFECTION: ICD-10-CM

## 2024-10-08 NOTE — PATIENT INSTRUCTIONS
ask your healthcare professional. [employer] disclaims any warranty or liability for your use of this information.          Labor: Care Instructions  Your Care Instructions     labor is the start of labor between 20 and 36 weeks of pregnancy. A full-term pregnancy lasts 37 to 42 weeks. In labor, the uterus contracts to open the cervix. This is the first stage of childbirth.  labor can be caused by a problem with the baby, the mother, or both. Often the cause is not known.  In some cases, doctors use medicines to try to delay labor until 34 or more weeks of pregnancy. By this time, a baby has grown enough so that problems are not likely. In some cases--such as with a serious infection--it is healthier for the baby to be born early. Your treatment will depend on how far along you are in your pregnancy and on your health and your baby's health.  Follow-up care is a key part of your treatment and safety. Be sure to make and go to all appointments, and call your doctor if you are having problems. It's also a good idea to know your test results and keep a list of the medicines you take.  How can you care for yourself at home?  If your doctor prescribed medicines, take them exactly as directed. Call your doctor if you think you are having a problem with your medicine.  Rest until your doctor advises you about activity. He or she will tell you if you should stay in bed most of the time. You may need to arrange for  if you have young children.  Do not have sexual intercourse unless your doctor says it is safe.  Use pads, not tampons, if you have vaginal bleeding.  Make sure to drink plenty of fluids. Dehydration can lead to contractions. If you have kidney, heart, or liver disease and have to limit fluids, talk with your doctor before you increase the amount of fluids you drink.  Do not smoke or allow others to smoke around you. If you need help quitting, talk to your doctor about

## 2024-10-08 NOTE — PROGRESS NOTES
Presents for OB visit  Gestation 39w1d  Estimated Date of Delivery: 10/14/24    10/16/24 IOL 8 PM    Insurance: [employer]    IPV bag/mug given: 3/19/2024 Lorie Abdi LPN   Genetic Information given/reviewed: 3/5/2024 Chela Bonilla CNP   1st trimester education packet given:  2nd trimester education packet given:3/19/2024 Lorie Abdi LPN   3rd trimester education packet given:      FOB Name: Josh    Last Pap Smear: 3/23/23 negative  [x] Urine collected for culture   [x] Negative    [] Positive   [x] UDS collected 3/5/24 > negative  [x] Gc/ct collected 3/5/24 > negative  [x] Prenatal Labs completed     Genetic Screening:  [x]Declined 3/19/2024 Lorie Abdi LPN     Carrier Screening:  [x] Declined  [x] Known negative CF/SMA/thalassemia/sickle cell in     Baby aspirin screen:  []Positive  [x]Negative    Early 1 hour GTT:  []Yes  [x] No    AFP: declined  []Ordered  []Completed    Anatomy scan:  [x]Referral Sent 3/19/2024 Lorie Abdi LPN   [x]Scheduled 24 w/ consult    [x]Completed Placenta posterior,normal cord insertion: Marginal Cord Insertion, cervical length 3.64 cm, low lying placenta PRESENT  and 23.1mm from cervical os, no placenta previa .  [x] Follow up 24- she cancelled that follow up now scheduled 24    Fetal Echo:   [] Yes  [x] No    High Risk Factors:  Hx anxiety/depression  - Elevated EPDS 21 and KALA 7- 14 3/5/2024   - no medications, no longer seeing therapist  - referrals provided 3/5/2024   -Denies any  suicidal ideations at this time had noted thought about it hardly ever but states that was years ago not now or recently Denies any suicidal/homicidal ideatiosn or plans plans or delusions or hallucinations.  - reports mental health is doing well. Declined needs at this time 2024 Pilar Bernal, DO  Hx HSV  - titers completed in   - recommend valtrex suppression therapy at 36 weeks   Short interval between pregnancies   - vaginal delivery 
Pt couldn't void at the time of vitals.   
No signs of Brugada, WPW, AVRD, epsilon waves.

## 2024-10-13 ENCOUNTER — ANESTHESIA (OUTPATIENT)
Dept: LABOR AND DELIVERY | Age: 24
End: 2024-10-13
Payer: COMMERCIAL

## 2024-10-13 ENCOUNTER — HOSPITAL ENCOUNTER (INPATIENT)
Age: 24
LOS: 2 days | Discharge: HOME | End: 2024-10-15
Attending: OBSTETRICS & GYNECOLOGY | Admitting: STUDENT IN AN ORGANIZED HEALTH CARE EDUCATION/TRAINING PROGRAM
Payer: COMMERCIAL

## 2024-10-13 ENCOUNTER — ANESTHESIA EVENT (OUTPATIENT)
Dept: LABOR AND DELIVERY | Age: 24
End: 2024-10-13
Payer: COMMERCIAL

## 2024-10-13 PROBLEM — O09.899 SHORT INTERVAL BETWEEN PREGNANCIES AFFECTING PREGNANCY, ANTEPARTUM: Status: ACTIVE | Noted: 2024-10-13

## 2024-10-13 PROBLEM — Z3A.39 39 WEEKS GESTATION OF PREGNANCY: Status: ACTIVE | Noted: 2024-10-13

## 2024-10-13 PROBLEM — O43.199 MARGINAL INSERTION OF UMBILICAL CORD AFFECTING MANAGEMENT OF MOTHER: Status: ACTIVE | Noted: 2024-10-13

## 2024-10-13 LAB
ABO + RH BLD: NORMAL
AMPHET UR QL SCN: NEGATIVE
ARM BAND NUMBER: NORMAL
BARBITURATES UR QL SCN: NEGATIVE
BASOPHILS # BLD: 0.04 K/UL (ref 0–0.2)
BASOPHILS NFR BLD: 0 % (ref 0–2)
BENZODIAZ UR QL: NEGATIVE
BLOOD BANK SAMPLE EXPIRATION: NORMAL
BLOOD GROUP ANTIBODIES SERPL: NEGATIVE
CANNABINOIDS UR QL SCN: NEGATIVE
COCAINE UR QL SCN: NEGATIVE
EOSINOPHIL # BLD: 0.1 K/UL (ref 0–0.44)
EOSINOPHILS RELATIVE PERCENT: 1 % (ref 1–4)
ERYTHROCYTE [DISTWIDTH] IN BLOOD BY AUTOMATED COUNT: 13.2 % (ref 11.8–14.4)
FENTANYL UR QL: NEGATIVE
HCT VFR BLD AUTO: 36.8 % (ref 36.3–47.1)
HGB BLD-MCNC: 12.1 G/DL (ref 11.9–15.1)
IMM GRANULOCYTES # BLD AUTO: 0.08 K/UL (ref 0–0.3)
IMM GRANULOCYTES NFR BLD: 1 %
LYMPHOCYTES NFR BLD: 2.41 K/UL (ref 1.1–3.7)
LYMPHOCYTES RELATIVE PERCENT: 21 % (ref 24–43)
MCH RBC QN AUTO: 28.7 PG (ref 25.2–33.5)
MCHC RBC AUTO-ENTMCNC: 32.9 G/DL (ref 28.4–34.8)
MCV RBC AUTO: 87.2 FL (ref 82.6–102.9)
METHADONE UR QL: NEGATIVE
MONOCYTES NFR BLD: 1.19 K/UL (ref 0.1–1.2)
MONOCYTES NFR BLD: 10 % (ref 3–12)
NEUTROPHILS NFR BLD: 67 % (ref 36–65)
NEUTS SEG NFR BLD: 7.61 K/UL (ref 1.5–8.1)
NRBC BLD-RTO: 0 PER 100 WBC
OPIATES UR QL SCN: NEGATIVE
OXYCODONE UR QL SCN: NEGATIVE
PCP UR QL SCN: NEGATIVE
PLATELET # BLD AUTO: 204 K/UL (ref 138–453)
PMV BLD AUTO: 12 FL (ref 8.1–13.5)
RBC # BLD AUTO: 4.22 M/UL (ref 3.95–5.11)
T PALLIDUM AB SER QL IA: NONREACTIVE
TEST INFORMATION: NORMAL
WBC OTHER # BLD: 11.4 K/UL (ref 3.5–11.3)

## 2024-10-13 PROCEDURE — 3700000025 EPIDURAL BLOCK: Performed by: ANESTHESIOLOGY

## 2024-10-13 PROCEDURE — 85025 COMPLETE CBC W/AUTO DIFF WBC: CPT

## 2024-10-13 PROCEDURE — 51701 INSERT BLADDER CATHETER: CPT

## 2024-10-13 PROCEDURE — 86780 TREPONEMA PALLIDUM: CPT

## 2024-10-13 PROCEDURE — 2580000003 HC RX 258

## 2024-10-13 PROCEDURE — 6360000002 HC RX W HCPCS

## 2024-10-13 PROCEDURE — 10907ZC DRAINAGE OF AMNIOTIC FLUID, THERAPEUTIC FROM PRODUCTS OF CONCEPTION, VIA NATURAL OR ARTIFICIAL OPENING: ICD-10-PCS | Performed by: STUDENT IN AN ORGANIZED HEALTH CARE EDUCATION/TRAINING PROGRAM

## 2024-10-13 PROCEDURE — 6370000000 HC RX 637 (ALT 250 FOR IP)

## 2024-10-13 PROCEDURE — 86901 BLOOD TYPING SEROLOGIC RH(D): CPT

## 2024-10-13 PROCEDURE — 2500000003 HC RX 250 WO HCPCS: Performed by: ANESTHESIOLOGY

## 2024-10-13 PROCEDURE — 86900 BLOOD TYPING SEROLOGIC ABO: CPT

## 2024-10-13 PROCEDURE — 80307 DRUG TEST PRSMV CHEM ANLYZR: CPT

## 2024-10-13 PROCEDURE — 6360000002 HC RX W HCPCS: Performed by: ANESTHESIOLOGY

## 2024-10-13 PROCEDURE — 1220000000 HC SEMI PRIVATE OB R&B

## 2024-10-13 PROCEDURE — 2500000003 HC RX 250 WO HCPCS: Performed by: NURSE ANESTHETIST, CERTIFIED REGISTERED

## 2024-10-13 PROCEDURE — 7200000001 HC VAGINAL DELIVERY

## 2024-10-13 PROCEDURE — 59400 OBSTETRICAL CARE: CPT | Performed by: STUDENT IN AN ORGANIZED HEALTH CARE EDUCATION/TRAINING PROGRAM

## 2024-10-13 PROCEDURE — 86850 RBC ANTIBODY SCREEN: CPT

## 2024-10-13 PROCEDURE — 2500000003 HC RX 250 WO HCPCS

## 2024-10-13 RX ORDER — SODIUM CHLORIDE 0.9 % (FLUSH) 0.9 %
5-40 SYRINGE (ML) INJECTION PRN
Status: DISCONTINUED | OUTPATIENT
Start: 2024-10-13 | End: 2024-10-15 | Stop reason: HOSPADM

## 2024-10-13 RX ORDER — LIDOCAINE HYDROCHLORIDE 10 MG/ML
30 INJECTION, SOLUTION EPIDURAL; INFILTRATION; INTRACAUDAL; PERINEURAL PRN
Status: DISCONTINUED | OUTPATIENT
Start: 2024-10-13 | End: 2024-10-13

## 2024-10-13 RX ORDER — EPHEDRINE SULFATE/0.9% NACL/PF 25 MG/5 ML
10 SYRINGE (ML) INTRAVENOUS
Status: COMPLETED | OUTPATIENT
Start: 2024-10-13 | End: 2024-10-13

## 2024-10-13 RX ORDER — EPHEDRINE SULFATE/0.9% NACL/PF 25 MG/5 ML
5 SYRINGE (ML) INTRAVENOUS PRN
Status: DISCONTINUED | OUTPATIENT
Start: 2024-10-14 | End: 2024-10-13

## 2024-10-13 RX ORDER — SODIUM CHLORIDE 9 MG/ML
INJECTION, SOLUTION INTRAVENOUS PRN
Status: DISCONTINUED | OUTPATIENT
Start: 2024-10-13 | End: 2024-10-13

## 2024-10-13 RX ORDER — SIMETHICONE 80 MG
80 TABLET,CHEWABLE ORAL EVERY 6 HOURS PRN
Status: DISCONTINUED | OUTPATIENT
Start: 2024-10-13 | End: 2024-10-13

## 2024-10-13 RX ORDER — SODIUM CHLORIDE, SODIUM LACTATE, POTASSIUM CHLORIDE, CALCIUM CHLORIDE 600; 310; 30; 20 MG/100ML; MG/100ML; MG/100ML; MG/100ML
INJECTION, SOLUTION INTRAVENOUS CONTINUOUS
Status: DISCONTINUED | OUTPATIENT
Start: 2024-10-13 | End: 2024-10-13

## 2024-10-13 RX ORDER — DIPHENHYDRAMINE HYDROCHLORIDE 50 MG/ML
25 INJECTION INTRAMUSCULAR; INTRAVENOUS EVERY 4 HOURS PRN
Status: DISCONTINUED | OUTPATIENT
Start: 2024-10-13 | End: 2024-10-13

## 2024-10-13 RX ORDER — SENNA AND DOCUSATE SODIUM 50; 8.6 MG/1; MG/1
1 TABLET, FILM COATED ORAL DAILY
Status: DISCONTINUED | OUTPATIENT
Start: 2024-10-13 | End: 2024-10-13

## 2024-10-13 RX ORDER — VALACYCLOVIR HYDROCHLORIDE 500 MG/1
500 TABLET, FILM COATED ORAL 2 TIMES DAILY
Status: DISCONTINUED | OUTPATIENT
Start: 2024-10-13 | End: 2024-10-13

## 2024-10-13 RX ORDER — SODIUM CHLORIDE 0.9 % (FLUSH) 0.9 %
5-40 SYRINGE (ML) INJECTION EVERY 12 HOURS SCHEDULED
Status: DISCONTINUED | OUTPATIENT
Start: 2024-10-13 | End: 2024-10-13

## 2024-10-13 RX ORDER — SENNA AND DOCUSATE SODIUM 50; 8.6 MG/1; MG/1
1 TABLET, FILM COATED ORAL 2 TIMES DAILY
Qty: 60 TABLET | Refills: 0 | Status: SHIPPED | OUTPATIENT
Start: 2024-10-13

## 2024-10-13 RX ORDER — IBUPROFEN 600 MG/1
600 TABLET, FILM COATED ORAL EVERY 6 HOURS PRN
Qty: 40 TABLET | Refills: 1 | Status: SHIPPED | OUTPATIENT
Start: 2024-10-13

## 2024-10-13 RX ORDER — SODIUM CHLORIDE, SODIUM LACTATE, POTASSIUM CHLORIDE, AND CALCIUM CHLORIDE .6; .31; .03; .02 G/100ML; G/100ML; G/100ML; G/100ML
500 INJECTION, SOLUTION INTRAVENOUS PRN
Status: DISCONTINUED | OUTPATIENT
Start: 2024-10-13 | End: 2024-10-13

## 2024-10-13 RX ORDER — ONDANSETRON 4 MG/1
4 TABLET, ORALLY DISINTEGRATING ORAL EVERY 6 HOURS PRN
Status: DISCONTINUED | OUTPATIENT
Start: 2024-10-13 | End: 2024-10-15 | Stop reason: HOSPADM

## 2024-10-13 RX ORDER — LIDOCAINE HYDROCHLORIDE 10 MG/ML
INJECTION, SOLUTION EPIDURAL; INFILTRATION; INTRACAUDAL; PERINEURAL
Status: DISCONTINUED | OUTPATIENT
Start: 2024-10-13 | End: 2024-10-13 | Stop reason: SDUPTHER

## 2024-10-13 RX ORDER — ACETAMINOPHEN 500 MG
1000 TABLET ORAL EVERY 6 HOURS PRN
Qty: 30 TABLET | Refills: 1 | Status: SHIPPED | OUTPATIENT
Start: 2024-10-13

## 2024-10-13 RX ORDER — EPHEDRINE SULFATE/0.9% NACL/PF 25 MG/5 ML
10 SYRINGE (ML) INTRAVENOUS
Status: DISCONTINUED | OUTPATIENT
Start: 2024-10-13 | End: 2024-10-13

## 2024-10-13 RX ORDER — ACETAMINOPHEN 500 MG
1000 TABLET ORAL EVERY 6 HOURS
Status: DISCONTINUED | OUTPATIENT
Start: 2024-10-14 | End: 2024-10-15 | Stop reason: HOSPADM

## 2024-10-13 RX ORDER — ACETAMINOPHEN 500 MG
1000 TABLET ORAL EVERY 6 HOURS PRN
Status: DISCONTINUED | OUTPATIENT
Start: 2024-10-13 | End: 2024-10-13

## 2024-10-13 RX ORDER — NALOXONE HYDROCHLORIDE 0.4 MG/ML
INJECTION, SOLUTION INTRAMUSCULAR; INTRAVENOUS; SUBCUTANEOUS PRN
Status: DISCONTINUED | OUTPATIENT
Start: 2024-10-13 | End: 2024-10-13

## 2024-10-13 RX ORDER — ONDANSETRON 2 MG/ML
4 INJECTION INTRAMUSCULAR; INTRAVENOUS EVERY 6 HOURS PRN
Status: DISCONTINUED | OUTPATIENT
Start: 2024-10-13 | End: 2024-10-15 | Stop reason: HOSPADM

## 2024-10-13 RX ORDER — ONDANSETRON 2 MG/ML
4 INJECTION INTRAMUSCULAR; INTRAVENOUS EVERY 6 HOURS PRN
Status: DISCONTINUED | OUTPATIENT
Start: 2024-10-13 | End: 2024-10-13

## 2024-10-13 RX ORDER — SENNA AND DOCUSATE SODIUM 50; 8.6 MG/1; MG/1
2 TABLET, FILM COATED ORAL 2 TIMES DAILY
Status: DISCONTINUED | OUTPATIENT
Start: 2024-10-14 | End: 2024-10-15 | Stop reason: HOSPADM

## 2024-10-13 RX ORDER — LIDOCAINE HYDROCHLORIDE AND EPINEPHRINE 15; 5 MG/ML; UG/ML
INJECTION, SOLUTION EPIDURAL
Status: DISCONTINUED | OUTPATIENT
Start: 2024-10-13 | End: 2024-10-13 | Stop reason: SDUPTHER

## 2024-10-13 RX ORDER — SODIUM CHLORIDE 0.9 % (FLUSH) 0.9 %
5-40 SYRINGE (ML) INJECTION EVERY 12 HOURS SCHEDULED
Status: DISCONTINUED | OUTPATIENT
Start: 2024-10-14 | End: 2024-10-15 | Stop reason: HOSPADM

## 2024-10-13 RX ORDER — IBUPROFEN 600 MG/1
600 TABLET, FILM COATED ORAL EVERY 6 HOURS SCHEDULED
Status: DISCONTINUED | OUTPATIENT
Start: 2024-10-14 | End: 2024-10-15 | Stop reason: HOSPADM

## 2024-10-13 RX ORDER — DIPHENHYDRAMINE HCL 25 MG
25 TABLET ORAL EVERY 4 HOURS PRN
Status: DISCONTINUED | OUTPATIENT
Start: 2024-10-13 | End: 2024-10-13

## 2024-10-13 RX ORDER — SEVOFLURANE 250 ML/250ML
1 LIQUID RESPIRATORY (INHALATION) CONTINUOUS PRN
Status: DISCONTINUED | OUTPATIENT
Start: 2024-10-13 | End: 2024-10-13

## 2024-10-13 RX ORDER — SODIUM CHLORIDE 0.9 % (FLUSH) 0.9 %
5-40 SYRINGE (ML) INJECTION PRN
Status: DISCONTINUED | OUTPATIENT
Start: 2024-10-13 | End: 2024-10-13

## 2024-10-13 RX ORDER — ONDANSETRON 4 MG/1
4 TABLET, ORALLY DISINTEGRATING ORAL EVERY 6 HOURS PRN
Status: DISCONTINUED | OUTPATIENT
Start: 2024-10-13 | End: 2024-10-13

## 2024-10-13 RX ORDER — SIMETHICONE 80 MG
80 TABLET,CHEWABLE ORAL EVERY 6 HOURS PRN
Status: DISCONTINUED | OUTPATIENT
Start: 2024-10-13 | End: 2024-10-15 | Stop reason: HOSPADM

## 2024-10-13 RX ORDER — SODIUM CHLORIDE 9 MG/ML
INJECTION, SOLUTION INTRAVENOUS PRN
Status: DISCONTINUED | OUTPATIENT
Start: 2024-10-13 | End: 2024-10-15 | Stop reason: HOSPADM

## 2024-10-13 RX ORDER — ROPIVACAINE HYDROCHLORIDE 2 MG/ML
INJECTION, SOLUTION EPIDURAL; INFILTRATION; PERINEURAL
Status: COMPLETED
Start: 2024-10-13 | End: 2024-10-13

## 2024-10-13 RX ADMIN — LIDOCAINE HYDROCHLORIDE,EPINEPHRINE BITARTRATE 5 ML: 15; .005 INJECTION, SOLUTION EPIDURAL; INFILTRATION; INTRACAUDAL; PERINEURAL at 14:57

## 2024-10-13 RX ADMIN — SODIUM CHLORIDE, PRESERVATIVE FREE 20 MG: 5 INJECTION INTRAVENOUS at 15:35

## 2024-10-13 RX ADMIN — EPHEDRINE SULFATE 10 MG: 5 INJECTION INTRAVENOUS at 15:30

## 2024-10-13 RX ADMIN — SODIUM CHLORIDE, POTASSIUM CHLORIDE, SODIUM LACTATE AND CALCIUM CHLORIDE: 600; 310; 30; 20 INJECTION, SOLUTION INTRAVENOUS at 12:35

## 2024-10-13 RX ADMIN — VALACYCLOVIR HYDROCHLORIDE 500 MG: 500 TABLET, FILM COATED ORAL at 12:51

## 2024-10-13 RX ADMIN — IBUPROFEN 600 MG: 600 TABLET, FILM COATED ORAL at 23:40

## 2024-10-13 RX ADMIN — ROPIVACAINE HYDROCHLORIDE 10 ML/HR: 2 INJECTION, SOLUTION EPIDURAL; INFILTRATION at 15:06

## 2024-10-13 RX ADMIN — Medication 87.3 MILLI-UNITS/MIN: at 22:11

## 2024-10-13 RX ADMIN — ONDANSETRON 4 MG: 2 INJECTION INTRAMUSCULAR; INTRAVENOUS at 17:09

## 2024-10-13 RX ADMIN — Medication 1 MILLI-UNITS/MIN: at 18:51

## 2024-10-13 RX ADMIN — SODIUM CHLORIDE, PRESERVATIVE FREE 20 MG: 5 INJECTION INTRAVENOUS at 20:36

## 2024-10-13 RX ADMIN — Medication 166.7 ML: at 21:57

## 2024-10-13 RX ADMIN — LIDOCAINE HYDROCHLORIDE 3 ML: 10 INJECTION, SOLUTION EPIDURAL; INFILTRATION; INTRACAUDAL; PERINEURAL at 14:52

## 2024-10-13 ASSESSMENT — PAIN DESCRIPTION - DESCRIPTORS: DESCRIPTORS: CRAMPING

## 2024-10-13 ASSESSMENT — PAIN DESCRIPTION - LOCATION: LOCATION: ABDOMEN;BACK

## 2024-10-13 ASSESSMENT — PAIN SCALES - GENERAL: PAINLEVEL_OUTOF10: 3

## 2024-10-13 NOTE — PLAN OF CARE
Problem: Vaginal Birth or  Section  Goal: Fetal and maternal status remain reassuring during the birth process  Description:  Birth OB-Pregnancy care plan goal which identifies if the fetal and maternal status remain reassuring during the birth process  Outcome: Progressing     Problem: Pain  Goal: Verbalizes/displays adequate comfort level or baseline comfort level  Outcome: Progressing     Problem: Infection - Adult  Goal: Absence of infection at discharge  Outcome: Progressing  Goal: Absence of infection during hospitalization  Outcome: Progressing  Goal: Absence of fever/infection during anticipated neutropenic period  Outcome: Progressing     Problem: Safety - Adult  Goal: Free from fall injury  Outcome: Progressing     Problem: Chronic Conditions and Co-morbidities  Goal: Patient's chronic conditions and co-morbidity symptoms are monitored and maintained or improved  Outcome: Progressing

## 2024-10-13 NOTE — ANESTHESIA PRE PROCEDURE
Department of Anesthesiology  Preprocedure Note       Name:  Venu Ramsey   Age:  24 y.o.  :  2000                                          MRN:  [id number]         Date:  10/13/2024      Surgeon: * No surgeons listed *    Procedure: * No procedures listed *    Medications prior to admission:   Prior to Admission medications    Medication Sig Start Date End Date Taking? Authorizing Provider   valACYclovir (VALTREX) 500 MG tablet Take 1 tablet by mouth 2 times daily 24  Yes Pilar Bernal DO   Prenatal Vit w/Ey-Umnatyjrv-TT (PNV PO) Take by mouth   Yes Provider, MD Mayank   Blood Glucose Monitoring Suppl (EASY STEP GLUCOSE MONITOR) ERMA Check sugars fasting, breakfast, lunch and dinner 24   Pilar Bernal DO   Lancets MISC 1 each by Does not apply route in the morning, at noon, in the evening, and at bedtime 24   Pilar Bernal DO   blood glucose test strips (ASCENSIA AUTODISC VI;ONE TOUCH ULTRA TEST VI) strip 1 each by In Vitro route 4 times daily As needed. 24   Pilar Bernal DO   Isopropyl Alcohol (ALCOHOL WIPES) 70 % MISC Cleanse area prior to testing glucose 24   Pilar Bernal DO   omeprazole (PRILOSEC) 20 MG delayed release capsule Take 1 capsule by mouth every morning (before breakfast)  Patient not taking: Reported on 10/13/2024 7/16/24   Chela Bonilla APRN - CNP   valACYclovir (VALTREX) 1 g tablet Take 1 tablet by mouth 2 times daily 24   Chela Bonilla APRN - CNP       Current medications:    Current Facility-Administered Medications   Medication Dose Route Frequency Provider Last Rate Last Admin    valACYclovir (VALTREX) tablet 500 mg  500 mg Oral BID Deb Ramirez MD   500 mg at 10/13/24 1251    lactated ringers IV soln infusion   IntraVENous Continuous Deb Ramirez  mL/hr at 10/13/24 1235 New Bag at 10/13/24 1235    sodium chloride flush 0.9 % injection 5-40 mL  5-40 mL IntraVENous 2 times per day Miracle,

## 2024-10-13 NOTE — FLOWSHEET NOTE
1441,  LORENZO Herman, at bedside.  Epidural procedure explained, risks discussed.  Pt verbalizes consent for epidural.   1443 patient positioned for epidural.1445 Time out completed.   1445 catheter placed. 1457 test dose given.  Epidural catheter taped and secured per anesthesia.   1502 to low fowlers with left uterine displacement. 1505  loading dose given. 1506 pump initiated.  Pt tolerated procedure well.

## 2024-10-13 NOTE — ANESTHESIA PROCEDURE NOTES
Epidural Block    Patient location during procedure: OB  Reason for block: labor epidural  Staffing  Performed: resident/CRNA   Anesthesiologist: Rufina Lebron MD  Resident/CRNA: Ashutosh Whiting APRN - CRNA  Performed by: Ashutosh Whiting APRN - CRNA  Authorized by: Rufina Lebron MD    Epidural  Patient position: sitting  Prep: Betadine  Patient monitoring: continuous pulse ox and frequent blood pressure checks  Approach: midline  Location: L3-4  Injection technique: EJ saline  Guidance: paresthesia technique  Provider prep: sterile gloves and mask  Needle  Needle type: Tuohy   Needle gauge: 17 G  Needle length: 3.5 in  Needle insertion depth: 6 cm  Catheter type: end hole  Catheter size: 19 G  Catheter at skin depth: 12 cm  Test dose: negativeCatheter Secured: tegaderm and tape  Assessment  Sensory level: T8  Hemodynamics: stable  Attempts: 1  Outcomes: uncomplicated  Preanesthetic Checklist  Completed: patient identified, IV checked, site marked, risks and benefits discussed, surgical/procedural consents, equipment checked, pre-op evaluation, timeout performed, anesthesia consent given, oxygen available, monitors applied/VS acknowledged, fire risk safety assessment completed and verbalized and blood product R/B/A discussed and consented

## 2024-10-13 NOTE — H&P
pap 2026    BMI 26           Patient Active Problem List    Diagnosis Date Noted    LGSIL of cervix of undetermined significance 2023     Priority: Medium     20 F Apg 7/9 Wt 7#13 2022     Priority: Medium    39 weeks gestation of pregnancy 10/13/2024    Moderate recurrent major depression (HCC) 2023    Generalized anxiety disorder 2023    ADHD 2014       Plan discussed with Dr. Bird, who is agreeable.     Steroids given this admission: No    Risks, benefits, alternatives and possible complications have been discussed in detail with the patient. Admission, and post admission procedures and expectations were discussed in detail. All questions were answered.    Attending's Name: Dr. Barb Ramirez MD  Ob/Gyn Resident  10/13/2024, 12:50 PM

## 2024-10-13 NOTE — FLOWSHEET NOTE
Pt to L&D for ctx and decreased fetal movement. Pt states at 3am she had consistent ctx that were 5-10 mins apart. Pt states they have now spaced out,  but the baby hasn't been moving since this morning around 7am. Pt denies LOF or bleeding.

## 2024-10-13 NOTE — CARE COORDINATION
ANTEPARTUM NOTE    39 weeks gestation of pregnancy [Z3A.39]    Venu  was admitted to L&D on 10/13/24  for spontaneous labor @ 39W 6D     OB GYN Provider: Dr. Day    Will meet with patient after delivery to verify name/address/phone/insurance and discuss discharge planning.     Anticipate DC home 2 nights after vaginal delivery or 4 nights after C/S delivery as long as hemodynamically stable.

## 2024-10-14 PROCEDURE — 6370000000 HC RX 637 (ALT 250 FOR IP)

## 2024-10-14 PROCEDURE — 2580000003 HC RX 258

## 2024-10-14 PROCEDURE — 99024 POSTOP FOLLOW-UP VISIT: CPT | Performed by: OBSTETRICS & GYNECOLOGY

## 2024-10-14 PROCEDURE — 1220000000 HC SEMI PRIVATE OB R&B

## 2024-10-14 RX ORDER — GABAPENTIN 300 MG/1
300 CAPSULE ORAL 3 TIMES DAILY
Status: DISCONTINUED | OUTPATIENT
Start: 2024-10-14 | End: 2024-10-15 | Stop reason: HOSPADM

## 2024-10-14 RX ADMIN — SODIUM CHLORIDE, PRESERVATIVE FREE 10 ML: 5 INJECTION INTRAVENOUS at 08:09

## 2024-10-14 RX ADMIN — SENNOSIDES AND DOCUSATE SODIUM 2 TABLET: 50; 8.6 TABLET ORAL at 20:03

## 2024-10-14 RX ADMIN — SENNOSIDES AND DOCUSATE SODIUM 2 TABLET: 50; 8.6 TABLET ORAL at 08:09

## 2024-10-14 RX ADMIN — ACETAMINOPHEN 1000 MG: 500 TABLET ORAL at 13:59

## 2024-10-14 RX ADMIN — ACETAMINOPHEN 1000 MG: 500 TABLET ORAL at 00:23

## 2024-10-14 RX ADMIN — ACETAMINOPHEN 1000 MG: 500 TABLET ORAL at 08:08

## 2024-10-14 RX ADMIN — SODIUM CHLORIDE, PRESERVATIVE FREE 10 ML: 5 INJECTION INTRAVENOUS at 20:04

## 2024-10-14 RX ADMIN — IBUPROFEN 600 MG: 600 TABLET, FILM COATED ORAL at 18:25

## 2024-10-14 RX ADMIN — ACETAMINOPHEN 1000 MG: 500 TABLET ORAL at 20:02

## 2024-10-14 RX ADMIN — IBUPROFEN 600 MG: 600 TABLET, FILM COATED ORAL at 12:26

## 2024-10-14 RX ADMIN — GABAPENTIN 300 MG: 300 CAPSULE ORAL at 16:32

## 2024-10-14 RX ADMIN — IBUPROFEN 600 MG: 600 TABLET, FILM COATED ORAL at 05:18

## 2024-10-14 ASSESSMENT — PAIN DESCRIPTION - LOCATION
LOCATION: ABDOMEN
LOCATION: BACK
LOCATION: ABDOMEN

## 2024-10-14 ASSESSMENT — PAIN DESCRIPTION - DESCRIPTORS
DESCRIPTORS: CRAMPING
DESCRIPTORS: CRAMPING
DESCRIPTORS: ACHING;DISCOMFORT
DESCRIPTORS: CRAMPING

## 2024-10-14 ASSESSMENT — PAIN SCALES - GENERAL
PAINLEVEL_OUTOF10: 5
PAINLEVEL_OUTOF10: 1
PAINLEVEL_OUTOF10: 2
PAINLEVEL_OUTOF10: 4
PAINLEVEL_OUTOF10: 5
PAINLEVEL_OUTOF10: 2
PAINLEVEL_OUTOF10: 3

## 2024-10-14 ASSESSMENT — PAIN DESCRIPTION - ORIENTATION
ORIENTATION: LOWER

## 2024-10-14 ASSESSMENT — PAIN - FUNCTIONAL ASSESSMENT
PAIN_FUNCTIONAL_ASSESSMENT: ACTIVITIES ARE NOT PREVENTED
PAIN_FUNCTIONAL_ASSESSMENT: ACTIVITIES ARE NOT PREVENTED

## 2024-10-14 NOTE — PLAN OF CARE
Problem: Vaginal Birth or  Section  Goal: Fetal and maternal status remain reassuring during the birth process  Description:  Birth OB-Pregnancy care plan goal which identifies if the fetal and maternal status remain reassuring during the birth process  10/14/2024 1924 by Luly Dodge, RN  Outcome: Completed  10/14/2024 184 by Frederick Cabrales, RN  Outcome: Progressing  10/14/2024 0602 by Cassidy Devi, RN  Outcome: Progressing

## 2024-10-14 NOTE — CARE COORDINATION
Social Work     Sw reviewed medical record (current active problem list) and tox screens and found no current concerns.     Sw spoke with mom and fob briefly to explain Sw role, inquire if any needs or concerns, and provide safe sleep education and discuss.  Mom denied any needs or questions and informs baby has a safe sleep environment (bass).     Mom denied any current s/s of anxiety or depression and is aware to reach out to OB if any s/s occur after dc.    Mom discussed she does have ongoing anxiety, states currently no escalations, aware to reach out if escalations occur.       Mom reports a good support system and denied any current questions or needs.      Mom reports this is her 2nd child, she also has a 22 month daughter.       Mom states ped will be [facility] (Dr. Veloz).      Sw encouraged parents to reach out if any issues or concerns arise.

## 2024-10-14 NOTE — FLOWSHEET NOTE
Patient admitted to room 735 from L&D via wheelchair. Oriented to room and surroundings.  Plan of care reviewed.  Verbalized understanding.  Instructed on infant security and safe sleep practices.  Preventing falls education provided .The following handouts given: A New Beginning: Your Guide to Postpartum Care, Rounding, gs Security System,Babies Cry A lot, Safe Sleep, Security and Visitation Guidelines. Call light placed within reach.

## 2024-10-14 NOTE — L&D DELIVERY NOTE
Vaginal Delivery Note  Department of Obstetrics and Gynecology  [facility]       Patient: Venu Ramsey   : 2000  MRN: [id number]   Date of delivery: 10/13/24     Pre-operative Diagnosis:   Veun Ramsey  at 39w6d  Spontaneous labor  Decreased fetal movement  Marginal cord insertion  Herpes simplex virus (sterile speculum exam negative)  Low lying placenta (resolved)  ADHD  Anxiety/depression  History of abnormal pap smear (low-grade squamous intraepithelial lesion)  BMI 26    Post-operative Diagnosis:   [place] living infant male  Spontaneous vaginal delivery  Spontaneous labor  Decreased fetal movement  Marginal cord insertion  Herpes simplex virus (sterile speculum exam negative)  Low lying placenta (resolved)  ADHD  Anxiety/depression  History of abnormal pap smear (low-grade squamous intraepithelial lesion)  BMI 26    Delivering Obstetrician & Assistant(s): Dr. John Bird, ; Kaelyn Hall DO    Infant Information:   Information for the patient's :  Chato Ramsey [[id number]]      Information for the patient's :  Chato Ramsey [[id number]]        Apgar scores: 8 at 1 minute and 9 at 5 minutes.     Anesthesia:  epidural anesthesia    Application and Delivery:    She was known to be GBS negative and did not require antibiotic prophylaxis.     The patient progressed well, received an epidural, became complete and felt the urge to push. After pushing with contractions the fetal head delivered Cephalic, left occiput anterior over an intact perineum, nuchal cord was not present.  The anterior, then posterior shoulder delivered easily and atraumatically followed by the rest of the infant. Nose and mouth suctioned with bulb suction, infant was stimulated and dried. Cord was clamped and cut after one minute delayed cord clamping and infant was placed on maternal chest, and attended by RN for evaluation. The delivery of the placenta was spontaneous and

## 2024-10-14 NOTE — ANESTHESIA POSTPROCEDURE EVALUATION
Department of Anesthesiology  Postprocedure Note    Patient: Venu Ramsey  MRN: [id number]  YOB: 2000  Date of evaluation: 10/14/2024    Procedure Summary       Date: 10/13/24 Room / Location:     Anesthesia Start: 1441 Anesthesia Stop: 2154    Procedure: Labor Analgesia Diagnosis:     Scheduled Providers:  Responsible Provider: Rufina Lebron MD    Anesthesia Type: epidural ASA Status: 2            Anesthesia Type: No value filed.    Marcus Phase I: Marcus Score: 10    Marcus Phase II:      Anesthesia Post Evaluation    Patient location during evaluation: floor  Patient participation: complete - patient participated  Level of consciousness: awake and alert  Pain score: 0  Airway patency: patent  Nausea & Vomiting: no nausea and no vomiting  Cardiovascular status: blood pressure returned to baseline  Respiratory status: acceptable  Hydration status: euvolemic  Pain management: adequate        No notable events documented.

## 2024-10-14 NOTE — FLOWSHEET NOTE
Initiation of Electric Breast Pumping     Pumping Initiated at 0100    Initiated due to    []   Baby in NICU   [x]   Plans exclusive pumping   []   Infant weight loss(supplement)   []   Baby not latching well    Flange Size    Right:   Left:     [x]   24    [x]   24     []   27    []   27     []   30    []   30     []   36    []   36  Instructions   [x]   Verbal instructions on how to setup pump and how to use initiation phase   [x]   Written sheet\" How to keep your breast pump kit clean\"   [x]   Expectation sheet for Breastfeeding mothers with pumping log   [x]   Frequency of pumping   [x]   Collection,labeling and storage of colostrum and milk    Supplies Provided   [x]   Pump initiation kit   [x]   Cleaning supplies (basin and soap)   [x]   Additional flange size   [x]   Oral syringes/snappies   [x]   Patient labels       -

## 2024-10-14 NOTE — CARE COORDINATION
CASE MANAGEMENT POST-PARTUM TRANSITIONAL CARE PLAN    39 weeks gestation of pregnancy [Z3A.39]    OB Provider: Dr. Barb Humphries met w/ Venu at her bedside to discuss DCP. She is S/P  on 10/3/24 @ 39w6d at 2154 of male infant    Writer verified address/phone number correct on facesheet. She states she lives with her  and their daughter. She denied barriers with transportation home, to doctor's appointments or with paying for medications upon discharge home.     MMO insurance correct. Writer notified her they have 30 days from date of birth to add  to insurance policy. She verbalized understanding.    Infant name on BC: Ashutosh.   Infant PCP Dr. Veloz @ [facility].     DME: None  HOME CARE: None    Anticipate DC home of couplet in private vehicle in 1-2 days status post vaginal delivery.    Readmission Risk              Risk of Unplanned Readmission:  4

## 2024-10-14 NOTE — PLAN OF CARE
Problem: Vaginal Birth or  Section  Goal: Fetal and maternal status remain reassuring during the birth process  Description:  Birth OB-Pregnancy care plan goal which identifies if the fetal and maternal status remain reassuring during the birth process  10/14/2024 1848 by Frederick Cabrales RN  Outcome: Progressing  10/14/2024 06 by Cassidy Devi RN  Outcome: Progressing     Problem: Pain  Goal: Verbalizes/displays adequate comfort level or baseline comfort level  10/14/2024 1848 by Frederick Cabrales RN  Outcome: Progressing  10/14/2024 0602 by Cassidy Devi RN  Outcome: Progressing     Problem: Infection - Adult  Goal: Absence of infection at discharge  10/14/2024 1848 by Frederick Cabrales RN  Outcome: Progressing  10/14/2024 06 by Cassidy Devi RN  Outcome: Progressing  Goal: Absence of infection during hospitalization  10/14/2024 1848 by Frederick Cabrales RN  Outcome: Progressing  10/14/2024 06 by Cassidy Devi RN  Outcome: Progressing  Goal: Absence of fever/infection during anticipated neutropenic period  10/14/2024 1848 by Frederick Cabrales RN  Outcome: Progressing  10/14/2024 06 by Cassidy Devi RN  Outcome: Progressing     Problem: Safety - Adult  Goal: Free from fall injury  10/14/2024 1848 by Frederick Cabrales RN  Outcome: Progressing  10/14/2024 06 by Cassidy Devi RN  Outcome: Progressing     Problem: Chronic Conditions and Co-morbidities  Goal: Patient's chronic conditions and co-morbidity symptoms are monitored and maintained or improved  10/14/2024 1848 by Frederick Cabrales RN  Outcome: Progressing  10/14/2024 06 by Cassidy Devi RN  Outcome: Progressing

## 2024-10-15 VITALS
OXYGEN SATURATION: 98 % | RESPIRATION RATE: 20 BRPM | TEMPERATURE: 97.5 F | SYSTOLIC BLOOD PRESSURE: 125 MMHG | HEART RATE: 70 BPM | DIASTOLIC BLOOD PRESSURE: 82 MMHG

## 2024-10-15 PROCEDURE — 6370000000 HC RX 637 (ALT 250 FOR IP)

## 2024-10-15 PROCEDURE — 99024 POSTOP FOLLOW-UP VISIT: CPT | Performed by: OBSTETRICS & GYNECOLOGY

## 2024-10-15 RX ADMIN — IBUPROFEN 600 MG: 600 TABLET, FILM COATED ORAL at 08:04

## 2024-10-15 RX ADMIN — IBUPROFEN 600 MG: 600 TABLET, FILM COATED ORAL at 00:21

## 2024-10-15 RX ADMIN — GABAPENTIN 300 MG: 300 CAPSULE ORAL at 08:05

## 2024-10-15 RX ADMIN — SENNOSIDES AND DOCUSATE SODIUM 2 TABLET: 50; 8.6 TABLET ORAL at 08:04

## 2024-10-15 RX ADMIN — ACETAMINOPHEN 1000 MG: 500 TABLET ORAL at 08:28

## 2024-10-15 RX ADMIN — ACETAMINOPHEN 1000 MG: 500 TABLET ORAL at 02:02

## 2024-10-15 ASSESSMENT — PAIN SCALES - GENERAL
PAINLEVEL_OUTOF10: 8
PAINLEVEL_OUTOF10: 3
PAINLEVEL_OUTOF10: 2

## 2024-10-15 ASSESSMENT — PAIN DESCRIPTION - LOCATION
LOCATION: ABDOMEN

## 2024-10-15 ASSESSMENT — PAIN DESCRIPTION - DESCRIPTORS
DESCRIPTORS: ACHING
DESCRIPTORS: CRAMPING
DESCRIPTORS: CRAMPING

## 2024-10-15 ASSESSMENT — PAIN DESCRIPTION - ORIENTATION: ORIENTATION: LOWER

## 2024-10-15 NOTE — PROGRESS NOTES
Labor Progress Note    Venu Ramsey is a 24 y.o. female  at 39w6d  The patient was seen and examined. Her pain is well controlled with her epidural. She reports fetal movement is present, denies contractions, complains of loss of fluid, denies vaginal bleeding.       Vital Signs:  Vitals:    10/13/24 2000 10/13/24 2025 10/13/24 2030 10/13/24 2031   BP: 108/61   92/64   Pulse: 98   85   Resp: 18   18   Temp:       TempSrc:       SpO2: 99% 98% 98%         FHT:  140 , moderate variability, accelerations present, decelerations absent  Contractions: irregular, every 2-3 minutes    Chaperone for Intimate Exam: Chaperone was present for entire exam, Chaperone Name: BERHANE Fay  Cervical Exam: 8 cm dilated, 90 effaced, 0 station  Pitocin: @ 2 mu/min    Membranes: Ruptured clear fluid  Scalp Electrode in place: absent  Intrauterine Pressure Catheter in Place: absent    Interventions: SVE    Assessment/Plan:  Venu Ramsey is a 24 y.o. female  at 39w6d admitted for spontaneous labor   - GBS negative, No indication for GBS prophylaxis   - VSS, afebrile   - cEFM/TOCO: CAT 1   - SVE: /0   - Patient is comfortable with her epidural   - S/p AROM (clr) @ 1625   - Continue Pitocin per protocol    - Will continue to monitor closely    Attending updated and in agreement with plan    Kaelyn Hall DO  Ob/Gyn Resident  10/13/2024, 8:32 PM    
Labor Progress Note    Venu Ramsey is a 24 y.o. female  at 39w6d  The patient was seen and examined. Her pain is well controlled. Feeling more pressure. She reports fetal movement is present, complains of contractions, complains of loss of fluid, denies vaginal bleeding.       Vital Signs:  Vitals:    10/13/24 1700 10/13/24 1730 10/13/24 1800 10/13/24 1830   BP: (!) 90/46 (!) 98/50 (!) 106/59 101/65   Pulse: (!) 116 (!) 110 85 91   Resp: 16 17 17 16   Temp:  98.2 °F (36.8 °C)     TempSrc:  Oral     SpO2: 99% 98% 99% 100%     FHT:  135 , moderate variability, accelerations present, decelerations absent  Contractions: not tracing well on TOCO    Chaperone for Intimate Exam: Chaperone was present for entire exam, Chaperone Name: BERHANE Fay  Cervical Exam: 7 cm dilated, 70 effaced, 0 station  Pitocin: @ 0 mu/min    Membranes: Ruptured clear fluid  Scalp Electrode in place: absent  Intrauterine Pressure Catheter in Place: absent    Interventions: SVE    Assessment/Plan:  Venu Ramsey is a 24 y.o. female  at 39w6d admitted forspontaneous labor   - GBS negative, No indication for GBS prophylaxis   - VSS, afebrile   - cEFM/TOCO   - AROM (clr) @ 1625   - Epidural with good pain control   - Continue to monitor closely     Attending updated and in agreement with plan    Darrius Thomson MD  Ob/Gyn Resident  10/13/2024, 5:08 PM          Attending Physician Statement      I have personally seen, evaluated and discussed the care of Venu Ramsey, including pertinent history and exam findings with the resident. I have reviewed and edited their note in the electronic medical record. The key elements of all parts of the encounter have been performed/reviewed by me.  I agree with the assessment, plan and orders as documented by the resident. (GC Modifier)    Patient complaining of pelvic pressure but overall working well with labor and comfortable with epidural. Currently category 1 FHT, continue close 
Labor Progress Note    Venu Ramsey is a 24 y.o. female  at 39w6d  The patient was seen and examined. Her pain is well controlled. She reports fetal movement is present, complains of contractions, denies loss of fluid, denies vaginal bleeding.       Vital Signs:  Vitals:    10/13/24 1630 10/13/24 1700 10/13/24 1730 10/13/24 1800   BP: (!) 99/46 (!) 90/46 (!) 98/50 (!) 106/59   Pulse: (!) 112 (!) 116 (!) 110 85   Resp: 18 16 17 17   Temp:   98.2 °F (36.8 °C)    TempSrc:   Oral    SpO2:  99% 98% 99%         FHT: 135, moderate variability, prolonged accelerations, no decelerations noted; tracing reviewed with Dr. Bird  Contractions: irregular, every 4-9 minutes    Chaperone for Intimate Exam: Chaperone was present for entire exam, Chaperone Name: BERHANE Fay  Cervical Exam: 5 cm dilated, 70% effaced, 0 station  Pitocin: none    Membranes: Intact  Scalp Electrode in place: absent  Intrauterine Pressure Catheter in Place: absent    Interventions: none    Assessment/Plan:  Venu Ramsey is a 24 y.o. female  at 39w6d admitted for spontaneous labor   - GBS negative, No indication for GBS prophylaxis  - VSS, afebrile    - cEFM and TOCO: cat 2 w/ irregular ctx, overall still reassuring for fetal wellbeing given moderate variability    - Membranes intact   - SVE 5/70%/0   - Discussed with patient that she was appropriate for AROM. Patient desires epidural before AROM. Epidural ordered. Will continue to monitor and perform AROM when epidural in place.     Attending updated and in agreement with plan    Deb Ramirez MD  Ob/Gyn Resident  10/13/2024, 2:10 PM    
Labor Progress Note    Venu Ramsey is a 24 y.o. female  at 39w6d  The patient was seen and examined. Reassessed patient for cervical change and assessed for need of Pitocin. Her pain is well controlled with her epidural. She reports fetal movement is present, denies contractions, complains of loss of fluid, denies vaginal bleeding.       Vital Signs:  Vitals:    10/13/24 1630 10/13/24 1700 10/13/24 1730 10/13/24 1800   BP: (!) 99/46 (!) 90/46 (!) 98/50 (!) 106/59   Pulse: (!) 112 (!) 116 (!) 110 85   Resp: 18 16 17 17   Temp:   98.2 °F (36.8 °C)    TempSrc:   Oral    SpO2:  99% 98% 99%        FHT:  145 , moderate variability, accelerations present, decelerations absent  Contractions: irregular, every 4-6 minutes    Chaperone for Intimate Exam: Chaperone was present for entire exam, Chaperone Name: BERHANE Fay  Cervical Exam: 7 cm dilated, 70 effaced, 0 station  Pitocin: @ 0 mu/min    Membranes: Ruptured clear fluid  Scalp Electrode in place: absent  Intrauterine Pressure Catheter in Place: absent    Interventions: SVE    Assessment/Plan:  Venu Ramsey is a 24 y.o. female  at 39w6d admitted for spontaneous labor   - GBS negative, No indication for GBS prophylaxis   - VSS, afebrile   - cEFM/TOCO: CAT 1   - SVE: 770/0   - Patient is comfortable with her epidural and denies feeling her contractions   - S/p AROM (clr) @ 1625   - Since no cervical change since AROM, discussed with patient about augmenting labor with Pitocin. Patient amenable to starting Pitocin.  - Start Pitocin per protocol    - Will continue to monitor closely    Attending updated and in agreement with plan    Kaelyn Hall DO  Ob/Gyn Resident  10/13/2024, 6:48 PM    
Labor Progress Note    Veun Ramsey is a 24 y.o. female  at 39w6d  The patient was seen and examined. Her pain is well controlled after epidural. She reports fetal movement is present, complains of contractions, denies loss of fluid, denies vaginal bleeding.       Vital Signs:  Vitals:    10/13/24 1630 10/13/24 1700 10/13/24 1730 10/13/24 1800   BP: (!) 99/46 (!) 90/46 (!) 98/50 (!) 106/59   Pulse: (!) 112 (!) 116 (!) 110 85   Resp: 18 16 17 17   Temp:   98.2 °F (36.8 °C)    TempSrc:   Oral    SpO2:  99% 98% 99%     FHT:  135 , moderate variability, accelerations present, decelerations absent  Contractions: irregular    Chaperone for Intimate Exam: Chaperone was present for entire exam, Chaperone Name: BERHANE Fay  Cervical Exam: 6 cm dilated, 70 effaced, 0 station  Pitocin: @ 0 mu/min    Membranes: Ruptured clear fluid  Scalp Electrode in place: absent  Intrauterine Pressure Catheter in Place: absent    Interventions: AROM (clr)    Assessment/Plan:  Venu Ramsey is a 24 y.o. female  at 39w6d admitted for spontaneous labor   - GBS negative, No indication for GBS prophylaxis   - VSS, afebrile   - cEFM/TOCO   - AROM (clr) @ 1625   - Epidural with good pain control   - Continue to monitor closely     Attending updated and in agreement with plan    Darrius Thomson MD  Ob/Gyn Resident  10/13/2024, 5:07 PM    
Obstetric/Gynecology Resident Interval Note    FHT tracing reviewed. Baseline 125, moderate variability, + accelerations (some in which are prolonged accelerations), absent decelerations. No concern for late decelerations at this time, Tracing indicates a well oxygenated fetus.     Continue plan of care. Dr. Bird updated and has reviewed tracing and agrees that it is reassuring.     Darrius Thomson MD  OB/GYN Resident, PGY4  [place], [place]  10/13/2024, 2:45 PM      
POST PARTUM DAY # 2     Venu Ramsey is a 24 y.o. female  This patient was seen & examined today.  on 10/13/24    Her pregnancy was complicated by:   Patient Active Problem List   Diagnosis    ADHD     20 F Apg  Wt 7#13    LGSIL of cervix of undetermined significance    Moderate recurrent major depression (HCC)    Generalized anxiety disorder    39 weeks gestation of pregnancy    Short interval between pregnancies affecting pregnancy, antepartum    Marginal insertion of umbilical cord affecting management of mother    Encounter for care or examination of mother immediately after delivery       Today she is doing well without any chief complaint. Her lochia is light. She denies chest pain, shortness of breath, headache, lightheadedness and blurred vision. She is breast and bottle feeding and she denies any breast tenderness. She is ambulating well. Her voiding pattern is normal. I reviewed signs and symptoms of post partum depression with the patient, she currently denies any of these symptoms. She is tolerating solids.     Vital Signs:  /81   Pulse 93   Temp 97.4 °F (36.3 °C) (Oral)   Resp 16   LMP 2024   SpO2 98%   Breastfeeding Unknown     Physical Exam:  General:  no apparent distress, alert and cooperative  Neurologic:  alert, oriented, normal speech, no focal findings or movement disorder noted  Lungs:  No increased work of breathing  Abdomen: abdomen soft, non-distended, non-tender  Fundus: non-tender, firm, below umbilicus  Extremities:  no calf tenderness, non edematous    Lab:  Lab Results   Component Value Date    HGB 12.1 10/13/2024     Lab Results   Component Value Date    HCT 36.8 10/13/2024       Assessment/Plan:  Venu Ramsey is a  PPD #2 s/p    - Doing well, VSS   - Male infant in General Care Nursery, circumcision desired   - Encourage ambulation   - Labs if Sx    - Motrin/tylenol for pain    - Continue PP care   Rh positive/Rubella immune  Both 
Pt refused discharge meds- states she has all 3 medications at home  
Barb, DO

## 2024-10-15 NOTE — PLAN OF CARE
Problem: Pain  Goal: Verbalizes/displays adequate comfort level or baseline comfort level  10/15/2024 1147 by Debbie Glasgow RN  Outcome: Adequate for Discharge  10/15/2024 0500 by Luly Dodge RN  Outcome: Progressing     Problem: Infection - Adult  Goal: Absence of infection at discharge  10/15/2024 1147 by Debbie Glasgow RN  Outcome: Adequate for Discharge  10/15/2024 0500 by Luly Dodge RN  Outcome: Progressing  Goal: Absence of infection during hospitalization  10/15/2024 1147 by Debbie Glasgow RN  Outcome: Adequate for Discharge  10/15/2024 0500 by Luly Dodge RN  Outcome: Progressing  Goal: Absence of fever/infection during anticipated neutropenic period  10/15/2024 1147 by Debbie Glasgow RN  Outcome: Adequate for Discharge  10/15/2024 0500 by Luly Dodge RN  Outcome: Progressing     Problem: Safety - Adult  Goal: Free from fall injury  10/15/2024 1147 by Debbie Glasgow RN  Outcome: Adequate for Discharge  10/15/2024 0500 by Luly Dodge RN  Outcome: Progressing     Problem: Chronic Conditions and Co-morbidities  Goal: Patient's chronic conditions and co-morbidity symptoms are monitored and maintained or improved  10/15/2024 1147 by Debbie Glasgow RN  Outcome: Adequate for Discharge  10/15/2024 0500 by Luly Dodge RN  Outcome: Progressing

## 2024-10-15 NOTE — PLAN OF CARE
Problem: Pain  Goal: Verbalizes/displays adequate comfort level or baseline comfort level  10/15/2024 0500 by Luly Dodge RN  Outcome: Progressing  10/14/2024 1848 by Frederick Cabrales RN  Outcome: Progressing     Problem: Infection - Adult  Goal: Absence of infection at discharge  10/15/2024 0500 by Luly Dodge RN  Outcome: Progressing  10/14/2024 1848 by Frederick Cabrales RN  Outcome: Progressing  Goal: Absence of infection during hospitalization  10/15/2024 0500 by Luly Dodge RN  Outcome: Progressing  10/14/2024 1848 by Frederick Cabrales RN  Outcome: Progressing  Goal: Absence of fever/infection during anticipated neutropenic period  10/15/2024 0500 by Luly Dodge RN  Outcome: Progressing  10/14/2024 1848 by Frederick Cabrales RN  Outcome: Progressing     Problem: Safety - Adult  Goal: Free from fall injury  10/15/2024 0500 by Luly Dodge RN  Outcome: Progressing  10/14/2024 1848 by Frederick Cabrales RN  Outcome: Progressing     Problem: Chronic Conditions and Co-morbidities  Goal: Patient's chronic conditions and co-morbidity symptoms are monitored and maintained or improved  10/15/2024 0500 by Luly Dodge RN  Outcome: Progressing  10/14/2024 1848 by Frederick Cabrales RN  Outcome: Progressing

## 2024-10-15 NOTE — LACTATION NOTE
Mom reports that her milk supply has increased significantly. Reviewed pumping frequency and discussed different pumps to keep supply well established. Discussed comfort measures for engorgement and encouraged her to call with any questions.

## 2024-10-30 ENCOUNTER — POSTPARTUM VISIT (OUTPATIENT)
Dept: OBGYN CLINIC | Age: 24
End: 2024-10-30

## 2024-10-30 VITALS — DIASTOLIC BLOOD PRESSURE: 72 MMHG | BODY MASS INDEX: 23.3 KG/M2 | SYSTOLIC BLOOD PRESSURE: 118 MMHG | WEIGHT: 140 LBS

## 2024-10-30 DIAGNOSIS — N94.6 DYSMENORRHEA: ICD-10-CM

## 2024-10-30 PROBLEM — Z3A.39 39 WEEKS GESTATION OF PREGNANCY: Status: RESOLVED | Noted: 2024-10-13 | Resolved: 2024-10-30

## 2024-10-30 PROBLEM — O09.899 SHORT INTERVAL BETWEEN PREGNANCIES AFFECTING PREGNANCY, ANTEPARTUM: Status: RESOLVED | Noted: 2024-10-13 | Resolved: 2024-10-30

## 2024-10-30 PROBLEM — F41.1 GENERALIZED ANXIETY DISORDER: Status: RESOLVED | Noted: 2023-03-23 | Resolved: 2024-10-30

## 2024-10-30 PROBLEM — O43.199 MARGINAL INSERTION OF UMBILICAL CORD AFFECTING MANAGEMENT OF MOTHER: Status: RESOLVED | Noted: 2024-10-13 | Resolved: 2024-10-30

## 2024-10-30 PROCEDURE — 99024 POSTOP FOLLOW-UP VISIT: CPT | Performed by: STUDENT IN AN ORGANIZED HEALTH CARE EDUCATION/TRAINING PROGRAM

## 2024-10-30 RX ORDER — VALACYCLOVIR HYDROCHLORIDE 500 MG/1
1000 TABLET, FILM COATED ORAL 2 TIMES DAILY
Qty: 90 TABLET | Refills: 1 | Status: SHIPPED | OUTPATIENT
Start: 2024-10-30

## 2024-10-30 ASSESSMENT — ANXIETY QUESTIONNAIRES
GAD7 TOTAL SCORE: 8
7. FEELING AFRAID AS IF SOMETHING AWFUL MIGHT HAPPEN: NOT AT ALL
5. BEING SO RESTLESS THAT IT IS HARD TO SIT STILL: NOT AT ALL
2. NOT BEING ABLE TO STOP OR CONTROL WORRYING: MORE THAN HALF THE DAYS
6. BECOMING EASILY ANNOYED OR IRRITABLE: SEVERAL DAYS
1. FEELING NERVOUS, ANXIOUS, OR ON EDGE: MORE THAN HALF THE DAYS
3. WORRYING TOO MUCH ABOUT DIFFERENT THINGS: MORE THAN HALF THE DAYS
IF YOU CHECKED OFF ANY PROBLEMS ON THIS QUESTIONNAIRE, HOW DIFFICULT HAVE THESE PROBLEMS MADE IT FOR YOU TO DO YOUR WORK, TAKE CARE OF THINGS AT HOME, OR GET ALONG WITH OTHER PEOPLE: NOT DIFFICULT AT ALL
4. TROUBLE RELAXING: SEVERAL DAYS

## 2024-10-30 NOTE — PROGRESS NOTES
Postpartum Visit    Venu Ramsey is a 24 y.o. female , Post Partum    The patient was seen. She has no chief complaint today.    She is breast feeding and denies signs or symptoms of mastitis.  The patient completed the E.P.D.S. Post Partum Depression Evaluation form and EPDS Score of 10. She does not have signs or symptoms of post partum depression. She denies any suicidal thoughts with a plan, intent to harm others, and delusional ideas. She does admit to having good home support. Today her lochia is light she denies any dizziness or shortness of breath.  Her bowels are regular and she denies any urinary tract symptomology. She is using abstience for contraception. Desires progesterone for contraception and treatment of dysmenorrhea.    She was seen and counseled on all forms of birth control both male and female, reversible and none for control of heavy or painful periods or contraceptive purposes.  Discussed possible increased risk of developing a blood clot which may increase morbidity and mortality.  Discussed that smoking while on contraception may increase this risk and all patients are encouraged to stop use of tobacco products.  She was instructed on barrier contraception for STD prevention.  The life-threatening side effect profile was reviewed in detail and the patient was instructed that if these occur to stop using the medication and report to the emergency department or call 911. She denies a personal or family history of blood clots in the lungs or legs, stroke, TIA, migraines with aura, or sudden cardiac death.  Contraindications to estrogen: no      Does have history of anxiety and depression. States is experiencing some anxiety postpartum. Has used resources and meds in the past. Declines needs at this time and would like to monitor symptoms. Also discussed atarax prn.    Her pregnancy was complicated by:  Patient Active Problem List    Diagnosis Date Noted    LGSIL of cervix of

## 2024-11-25 ENCOUNTER — POSTPARTUM VISIT (OUTPATIENT)
Dept: OBGYN CLINIC | Age: 24
End: 2024-11-25

## 2024-11-25 VITALS — BODY MASS INDEX: 23.7 KG/M2 | WEIGHT: 142.4 LBS | DIASTOLIC BLOOD PRESSURE: 64 MMHG | SYSTOLIC BLOOD PRESSURE: 110 MMHG

## 2024-11-25 PROCEDURE — 0503F POSTPARTUM CARE VISIT: CPT | Performed by: NURSE PRACTITIONER

## 2024-11-25 RX ORDER — ACETAMINOPHEN AND CODEINE PHOSPHATE 120; 12 MG/5ML; MG/5ML
1 SOLUTION ORAL DAILY
Qty: 28 TABLET | Refills: 3 | Status: SHIPPED | OUTPATIENT
Start: 2024-11-25

## 2024-11-25 ASSESSMENT — ENCOUNTER SYMPTOMS
NAUSEA: 0
CONSTIPATION: 0
SHORTNESS OF BREATH: 0
DIARRHEA: 0
COUGH: 0
VOMITING: 0

## 2024-11-25 ASSESSMENT — ANXIETY QUESTIONNAIRES
7. FEELING AFRAID AS IF SOMETHING AWFUL MIGHT HAPPEN: NOT AT ALL
2. NOT BEING ABLE TO STOP OR CONTROL WORRYING: MORE THAN HALF THE DAYS
1. FEELING NERVOUS, ANXIOUS, OR ON EDGE: MORE THAN HALF THE DAYS
5. BEING SO RESTLESS THAT IT IS HARD TO SIT STILL: NOT AT ALL
GAD7 TOTAL SCORE: 8
IF YOU CHECKED OFF ANY PROBLEMS ON THIS QUESTIONNAIRE, HOW DIFFICULT HAVE THESE PROBLEMS MADE IT FOR YOU TO DO YOUR WORK, TAKE CARE OF THINGS AT HOME, OR GET ALONG WITH OTHER PEOPLE: NOT DIFFICULT AT ALL
4. TROUBLE RELAXING: SEVERAL DAYS
6. BECOMING EASILY ANNOYED OR IRRITABLE: SEVERAL DAYS
3. WORRYING TOO MUCH ABOUT DIFFERENT THINGS: MORE THAN HALF THE DAYS

## 2024-11-25 NOTE — PROGRESS NOTES
[facility] PHYSICIANS [facility] [address]  [address]  [facility] [address]  Dept: [phone]  Dept Fax: [phone]    Venu Ramsey is a 24 y.o. female who presents today for her medical conditions/complaintsas noted below.  Venu Ramsey is c/o of Postpartum Care        HPI:     Venu presents for 6 week postpartum check up. Delivered Vaginally on 10/13/24 No complaints, of chest pain, S.O.B. Headaches, no abdominal pain, GI or  issues.  Breastfeeding Yes and supplementing with formula,  Signs mastitis No  Bleeding No When stopped few weeks  Birth control options wants progesterone only pill  for menstrual control Slynd was sent in but she states was too expensive.    The patient completed :   E.P.D.S. Evaluation form and scored 9.   KALA 7 and scored 8.  Denies any  suicidal/homicidal ideations or plans or delusions or hallucinations.    States feels she does have support and help at home  Last PAP:2023- NILM     Last HGB:12.1 on 10/13/24  GDM:no   gHTN or Preeclampsia: no     OB History    Para Term  AB Living   2 2 2     2   SAB IAB Ectopic Molar Multiple Live Births           0 2      # Outcome Date GA Lbr Maynor/2nd Weight Sex Type Anes PTL Lv   2 Term 10/13/24 39w6d 04:37 / 00:54 3.82 kg (8 lb 6.8 oz) M Vag-Spont EPI N MARCY      Complications: Meconium at birth   1 Term 22 40w4d 02:19 / 01:35 3.55 kg (7 lb 13.2 oz) F Vag-Spont EPI N MARCY       Past Medical History:   Diagnosis Date    ADHD (attention deficit hyperactivity disorder)     Anxiety     Blood circulation, collateral     Hx of Vaso Vagal Syncopy    Depression     Psychiatric problem     ADHD    Vasovagal syncope       No past surgical history on file.    Family History   Problem Relation Age of Onset    Diabetes Maternal Grandfather     Cancer Other         most of the females on the maternal side.       Social History     Tobacco Use    Smoking status: Former

## 2024-11-25 NOTE — PATIENT INSTRUCTIONS
stop-smoking programs and medicines. These can increase your chances of quitting for good.  Breastfeeding your child may help prevent SIDS.  Be wary of products that are billed as helping prevent SIDS. Talk to your doctor before buying any product that claims to reduce SIDS risk.  What to do while still pregnant  See your doctor regularly. Women who see a doctor early in and throughout their pregnancies are less likely to have babies who die of SIDS.  Eat a healthy, balanced diet, which can help prevent a premature baby or a baby with a low birth weight.  Do not smoke or let anyone else smoke in the house or around you. Smoking or exposure to smoke during pregnancy increases the risk of SIDS. If you need help quitting, talk to your doctor about stop-smoking programs and medicines. These can increase your chances of quitting for good.  Do not drink alcohol or take illegal drugs. Alcohol or drug use may cause your baby to be born early.  Follow-up care is a key part of your child's treatment and safety. Be sure to make and go to all appointments, and call your doctor if your child is having problems. It's also a good idea to know your child's test results and keep a list of the medicines your child takes.  Where can you learn more?  Go to [email or web address] and sign in to your [employer] account. Enter E820 in the Search Health Information box to learn more about \"Learning About Safe Sleep for Babies.\"     If you do not have an account, please click on the \"Sign Up Now\" link.  Current as of: December 12, 2018  Content Version: 12.0  © 5680-2366 [employer]. Care instructions adapted under license by [employer]. If you have questions about a medical condition or this instruction, always ask your healthcare professional. [employer] disclaims any warranty or liability for your use of this information.

## 2024-12-26 RX ORDER — ACETAMINOPHEN AND CODEINE PHOSPHATE 120; 12 MG/5ML; MG/5ML
1 SOLUTION ORAL DAILY
Qty: 28 TABLET | Refills: 3 | OUTPATIENT
Start: 2024-12-26

## 2025-02-21 ENCOUNTER — OFFICE VISIT (OUTPATIENT)
Dept: FAMILY MEDICINE CLINIC | Age: 25
End: 2025-02-21
Payer: COMMERCIAL

## 2025-02-21 VITALS
SYSTOLIC BLOOD PRESSURE: 112 MMHG | RESPIRATION RATE: 16 BRPM | DIASTOLIC BLOOD PRESSURE: 70 MMHG | WEIGHT: 142 LBS | TEMPERATURE: 98.4 F | HEART RATE: 70 BPM | BODY MASS INDEX: 23.63 KG/M2 | OXYGEN SATURATION: 98 %

## 2025-02-21 DIAGNOSIS — B35.1 FUNGAL NAIL INFECTION: Primary | ICD-10-CM

## 2025-02-21 PROCEDURE — 99213 OFFICE O/P EST LOW 20 MIN: CPT | Performed by: NURSE PRACTITIONER

## 2025-02-21 PROCEDURE — G8427 DOCREV CUR MEDS BY ELIG CLIN: HCPCS | Performed by: NURSE PRACTITIONER

## 2025-02-21 PROCEDURE — 1036F TOBACCO NON-USER: CPT | Performed by: NURSE PRACTITIONER

## 2025-02-21 PROCEDURE — G8420 CALC BMI NORM PARAMETERS: HCPCS | Performed by: NURSE PRACTITIONER

## 2025-02-21 RX ORDER — TERBINAFINE HYDROCHLORIDE 250 MG/1
250 TABLET ORAL DAILY
Qty: 30 TABLET | Refills: 0 | Status: SHIPPED | OUTPATIENT
Start: 2025-02-21 | End: 2025-03-23

## 2025-02-21 RX ORDER — CICLOPIROX OLAMINE 7.7 MG/G
CREAM TOPICAL
Qty: 30 G | Refills: 0 | Status: SHIPPED | OUTPATIENT
Start: 2025-02-21

## 2025-02-21 NOTE — PROGRESS NOTES
[facility] PHYSICIANS [facility]  [address]  [address]  [facility] [address]  Dept: [phone]  Dept Fax: [phone]    Venu Ramsey is a 24 y.o. female who presents today for her medical conditions/complaints of   Chief Complaint   Patient presents with    Nail Problem     Fungal infection on finger nails  Left thumb and right pinky           HPI:     /70   Pulse 70   Temp 98.4 °F (36.9 °C)   Resp 16   Wt 64.4 kg (142 lb)   SpO2 98%   Breastfeeding No   BMI 23.63 kg/m²       HPI   Pt presented to the clinic today with c/o fingernail fungus. This is a new problem. The current episode started 6 months ago. Associated symptoms include: left thumb nail and right pinky finger nail is discolored and cracking.  Painful to touch. Has noticed some bleeding around the nail when washing dishes.   Pertinent negatives include: No fever, chills, erythema or drainage .  Pt has tried Aquaphor and Triple antibiotic with little improvement.       Past Medical History:   Diagnosis Date    ADHD (attention deficit hyperactivity disorder)     Anxiety     Blood circulation, collateral     Hx of Vaso Vagal Syncopy    Depression     Psychiatric problem     ADHD    Vasovagal syncope         No past surgical history on file.    Family History   Problem Relation Age of Onset    Diabetes Maternal Grandfather     Cancer Other         most of the females on the maternal side.       Social History     Tobacco Use    Smoking status: Former     Types: Cigarettes    Smokeless tobacco: Never    Tobacco comments:     \"Smoked 10cigs in high school\"    \"6yrs ago vaped \"  5/28/2024   Substance Use Topics    Alcohol use: Not Currently        Prior to Visit Medications    Medication Sig Taking? Authorizing Provider   ciclopirox (LOPROX) 0.77 % cream Apply topically 2 times daily. Yes Jenny Salazar, APRN - CNP   terbinafine (LAMISIL) 250 MG tablet Take 1 tablet by mouth

## 2025-02-24 ENCOUNTER — TELEPHONE (OUTPATIENT)
Dept: OBGYN CLINIC | Age: 25
End: 2025-02-24

## 2025-02-24 NOTE — TELEPHONE ENCOUNTER
Patient was prescribed Terbinafine 250mg for a fungal infection on fingernails, was wondering if safe to take while breastfeeding.     Please advise.     Thank you,     Pt would like call back

## 2025-02-24 NOTE — TELEPHONE ENCOUNTER
Please call patient and let her know it is NOT recommended to breastfeed while on this medication. I would recommend either delaying starting the medication until done breastfeeding or stop breastfeeding prior to starting. Thank you.

## 2025-03-05 RX ORDER — ACETAMINOPHEN AND CODEINE PHOSPHATE 120; 12 MG/5ML; MG/5ML
1 SOLUTION ORAL DAILY
Qty: 84 TABLET | Refills: 0 | Status: SHIPPED | OUTPATIENT
Start: 2025-03-05

## 2025-03-18 ENCOUNTER — HOSPITAL ENCOUNTER (OUTPATIENT)
Age: 25
Setting detail: SPECIMEN
Discharge: HOME | End: 2025-03-18

## 2025-03-18 ENCOUNTER — OFFICE VISIT (OUTPATIENT)
Dept: OBGYN CLINIC | Age: 25
End: 2025-03-18
Payer: COMMERCIAL

## 2025-03-18 VITALS
HEIGHT: 65 IN | SYSTOLIC BLOOD PRESSURE: 116 MMHG | WEIGHT: 134.2 LBS | DIASTOLIC BLOOD PRESSURE: 68 MMHG | BODY MASS INDEX: 22.36 KG/M2

## 2025-03-18 DIAGNOSIS — Z01.419 WELL WOMAN EXAM WITH ROUTINE GYNECOLOGICAL EXAM: Primary | ICD-10-CM

## 2025-03-18 DIAGNOSIS — N94.6 DYSMENORRHEA: ICD-10-CM

## 2025-03-18 PROCEDURE — 99395 PREV VISIT EST AGE 18-39: CPT | Performed by: NURSE PRACTITIONER

## 2025-03-18 RX ORDER — LEVONORGESTREL AND ETHINYL ESTRADIOL 0.1-0.02MG
1 KIT ORAL DAILY
Qty: 1 PACKET | Refills: 3 | Status: SHIPPED | OUTPATIENT
Start: 2025-03-18

## 2025-03-18 ASSESSMENT — ENCOUNTER SYMPTOMS
VOMITING: 0
SHORTNESS OF BREATH: 0
COUGH: 0
NAUSEA: 0
COLOR CHANGE: 0

## 2025-03-18 ASSESSMENT — PATIENT HEALTH QUESTIONNAIRE - PHQ9: DEPRESSION UNABLE TO ASSESS: PT REFUSES

## 2025-03-18 NOTE — PROGRESS NOTES
Venu Ramsey is a 24 y.o.  here for her annual exam.  The patient was seen and examined.The patients past medical, surgical, social and family history were reviewed.  Current medications and allergies were reviewed, and documented in the chart.      Dmitri is  they have  2 children      Tobacco abuse No     Last PAP: 2023- NILM, hx abnormal May 2022- LSIL, that was her first PAP  Family hx uterine or ovarian cancer-denies  Family hx of breast cancer -denies  family hx colon cancer -denies  HPV vaccine: completed        Sexually active: yes ,  Dyspareunia: No, Vaginal discharge: no,  UTI symptoms: no, voiding difficulties: no, bowels regular:Yes bloating:no        Menstrual history:  Menarche age- 15, she was started on OCP POP at 6 week PP and has had one period since delivery She states stopped breastfeeding around 2 weeks ago. She would like to go back on prior CHC pill balcoltra.   oral contraceptive, denies hx of blood clot or clotting disorder, migraine with aura or uncontrolled HTN.  Denies chest pain or pressure, SOB, calf pain or swelling, headaches, or vision changes.   LMP: 25    OB History    Para Term  AB Living   2 2 2   2   SAB IAB Ectopic Molar Multiple Live Births       0 2      # Outcome Date GA Lbr Maynor/2nd Weight Sex Type Anes PTL Lv   2 Term 10/13/24 39w6d 04:37 / 00:54 3.82 kg (8 lb 6.8 oz) M Vag-Spont EPI N MARCY      Complications: Meconium at birth   1 Term 22 40w4d 02:19 / 01:35 3.55 kg (7 lb 13.2 oz) F Vag-Spont EPI N MARCY       Vitals:    25 1544   BP: 116/68   BP Site: Left Upper Arm   Patient Position: Sitting   BP Cuff Size: Medium Adult   Weight: 60.9 kg (134 lb 3.2 oz)   Height: 1.651 m (5' 5\")       Wt Readings from Last 3 Encounters:   25 60.9 kg (134 lb 3.2 oz)   25 64.4 kg (142 lb)   24 64.6 kg (142 lb 6.4 oz)     Past Medical History:   Diagnosis Date    ADHD (attention deficit hyperactivity disorder)     Anxiety     Blood

## 2025-04-01 LAB — CYTOLOGY REPORT: NORMAL

## 2025-04-04 ENCOUNTER — RESULTS FOLLOW-UP (OUTPATIENT)
Dept: OBGYN CLINIC | Age: 25
End: 2025-04-04

## 2025-04-28 DIAGNOSIS — N94.6 DYSMENORRHEA: ICD-10-CM

## 2025-04-28 DIAGNOSIS — B35.1 FUNGAL NAIL INFECTION: ICD-10-CM

## 2025-04-28 RX ORDER — LEVONORGESTREL AND ETHINYL ESTRADIOL 0.1-0.02MG
1 KIT ORAL DAILY
Qty: 1 PACKET | Refills: 3 | OUTPATIENT
Start: 2025-04-28

## 2025-04-28 RX ORDER — ACETAMINOPHEN AND CODEINE PHOSPHATE 120; 12 MG/5ML; MG/5ML
1 SOLUTION ORAL DAILY
Qty: 84 TABLET | Refills: 0 | OUTPATIENT
Start: 2025-04-28

## 2025-04-28 RX ORDER — CICLOPIROX OLAMINE 7.7 MG/G
CREAM TOPICAL
Qty: 30 G | Refills: 1 | Status: SHIPPED | OUTPATIENT
Start: 2025-04-28

## 2025-05-01 DIAGNOSIS — N94.6 DYSMENORRHEA: ICD-10-CM

## 2025-05-01 RX ORDER — LEVONORGESTREL AND ETHINYL ESTRADIOL 0.1-0.02MG
1 KIT ORAL DAILY
Qty: 1 PACKET | Refills: 3 | OUTPATIENT
Start: 2025-05-01

## 2025-05-23 DIAGNOSIS — B35.1 FUNGAL NAIL INFECTION: ICD-10-CM

## 2025-05-23 RX ORDER — TERBINAFINE HYDROCHLORIDE 250 MG/1
250 TABLET ORAL DAILY
Qty: 30 TABLET | Refills: 0 | Status: SHIPPED | OUTPATIENT
Start: 2025-05-23 | End: 2025-06-22

## 2025-05-23 NOTE — TELEPHONE ENCOUNTER
Patient asking for refill to last her to her upcoming appointment that was scheduled on 06/18/2025.    Last OV:  3/20/2024    Next OV: 6/18/2025    Pharmacy:   [facility] [id number] - [place] - [address] [phone] - F [phone]  [address] [place] [address]  Phone: [phone] Fax: [phone]       Confirmed? Yes

## 2025-05-25 DIAGNOSIS — N94.6 DYSMENORRHEA: ICD-10-CM

## 2025-05-27 RX ORDER — LEVONORGESTREL AND ETHINYL ESTRADIOL AND FERROUS FUMARATE 0.1-0.02MG
1 KIT ORAL DAILY
Qty: 28 TABLET | OUTPATIENT
Start: 2025-05-27

## 2025-05-29 ENCOUNTER — OFFICE VISIT (OUTPATIENT)
Dept: FAMILY MEDICINE CLINIC | Age: 25
End: 2025-05-29
Payer: COMMERCIAL

## 2025-05-29 VITALS
DIASTOLIC BLOOD PRESSURE: 80 MMHG | HEIGHT: 65 IN | TEMPERATURE: 99.9 F | HEART RATE: 84 BPM | BODY MASS INDEX: 21.33 KG/M2 | SYSTOLIC BLOOD PRESSURE: 106 MMHG | WEIGHT: 128 LBS | OXYGEN SATURATION: 98 %

## 2025-05-29 DIAGNOSIS — J06.9 VIRAL URI: Primary | ICD-10-CM

## 2025-05-29 DIAGNOSIS — J02.9 SORE THROAT: ICD-10-CM

## 2025-05-29 LAB
STREP PYOGENES DNA, POC: NEGATIVE
VALID INTERNAL CONTROL, POC: NORMAL

## 2025-05-29 PROCEDURE — G8420 CALC BMI NORM PARAMETERS: HCPCS | Performed by: NURSE PRACTITIONER

## 2025-05-29 PROCEDURE — 99213 OFFICE O/P EST LOW 20 MIN: CPT | Performed by: NURSE PRACTITIONER

## 2025-05-29 PROCEDURE — G8427 DOCREV CUR MEDS BY ELIG CLIN: HCPCS | Performed by: NURSE PRACTITIONER

## 2025-05-29 PROCEDURE — 87651 STREP A DNA AMP PROBE: CPT | Performed by: NURSE PRACTITIONER

## 2025-05-29 PROCEDURE — 1036F TOBACCO NON-USER: CPT | Performed by: NURSE PRACTITIONER

## 2025-05-29 ASSESSMENT — PATIENT HEALTH QUESTIONNAIRE - PHQ9
1. LITTLE INTEREST OR PLEASURE IN DOING THINGS: NOT AT ALL
7. TROUBLE CONCENTRATING ON THINGS, SUCH AS READING THE NEWSPAPER OR WATCHING TELEVISION: MORE THAN HALF THE DAYS
SUM OF ALL RESPONSES TO PHQ QUESTIONS 1-9: 0
4. FEELING TIRED OR HAVING LITTLE ENERGY: SEVERAL DAYS
SUM OF ALL RESPONSES TO PHQ QUESTIONS 1-9: 0
3. TROUBLE FALLING OR STAYING ASLEEP: NOT AT ALL
8. MOVING OR SPEAKING SO SLOWLY THAT OTHER PEOPLE COULD HAVE NOTICED. OR THE OPPOSITE, BEING SO FIGETY OR RESTLESS THAT YOU HAVE BEEN MOVING AROUND A LOT MORE THAN USUAL: NOT AT ALL
6. FEELING BAD ABOUT YOURSELF - OR THAT YOU ARE A FAILURE OR HAVE LET YOURSELF OR YOUR FAMILY DOWN: NOT AT ALL
1. LITTLE INTEREST OR PLEASURE IN DOING THINGS: NOT AT ALL
10. IF YOU CHECKED OFF ANY PROBLEMS, HOW DIFFICULT HAVE THESE PROBLEMS MADE IT FOR YOU TO DO YOUR WORK, TAKE CARE OF THINGS AT HOME, OR GET ALONG WITH OTHER PEOPLE: SOMEWHAT DIFFICULT
SUM OF ALL RESPONSES TO PHQ QUESTIONS 1-9: 0
SUM OF ALL RESPONSES TO PHQ QUESTIONS 1-9: 3
SUM OF ALL RESPONSES TO PHQ QUESTIONS 1-9: 0
5. POOR APPETITE OR OVEREATING: NOT AT ALL
2. FEELING DOWN, DEPRESSED OR HOPELESS: NOT AT ALL
9. THOUGHTS THAT YOU WOULD BE BETTER OFF DEAD, OR OF HURTING YOURSELF: NOT AT ALL

## 2025-05-29 ASSESSMENT — ENCOUNTER SYMPTOMS
SWOLLEN GLANDS: 0
SHORTNESS OF BREATH: 0
NAUSEA: 0
RHINORRHEA: 0
SORE THROAT: 1
COUGH: 1

## 2025-05-29 NOTE — PROGRESS NOTES
[facility] PHYSICIANS [facility], [facility] WALK-IN  [address]  [address]  [place] [id number]  Dept: [phone]     Venu Ramsey is a 24 y.o. female Established patient, who presents to the walk-in clinic today with conditions/complaints as noted below:    Chief Complaint   Patient presents with    Sore Throat     X 4-5 days, minimal cough          HPI:     Pharyngitis  This is a new problem. The current episode started in the past 7 days. The problem occurs constantly. The problem has been gradually worsening. Associated symptoms include congestion, coughing (slight) and a sore throat. Pertinent negatives include no chest pain, chills, fatigue, fever, headaches, myalgias, nausea or swollen glands. The symptoms are aggravated by swallowing. Treatments tried: mucinex and dayquil. The treatment provided mild relief.       Past Medical History:   Diagnosis Date    ADHD (attention deficit hyperactivity disorder)     Anxiety     Blood circulation, collateral     Hx of Vaso Vagal Syncopy    Depression     Psychiatric problem     ADHD    Vasovagal syncope        Current Outpatient Medications   Medication Sig Dispense Refill    terbinafine (LAMISIL) 250 MG tablet Take 1 tablet by mouth daily 30 tablet 0    ciclopirox (LOPROX) 0.77 % cream Apply topically 2 times daily. 30 g 1    Levonorgest-Eth Estradiol-Iron 0.1-20 MG-MCG(21) TABS Take 1 tablet by mouth daily 1 packet 3     No current facility-administered medications for this visit.       No Known Allergies    Review of Systems:     Review of Systems   Constitutional:  Negative for chills, fatigue and fever.   HENT:  Positive for congestion, ear pain and sore throat. Negative for rhinorrhea.    Respiratory:  Positive for cough (slight). Negative for shortness of breath.    Cardiovascular:  Negative for chest pain.   Gastrointestinal:  Negative for nausea.   Musculoskeletal:  Negative for myalgias.   Neurological:

## 2025-06-02 RX ORDER — LEVONORGESTREL AND ETHINYL ESTRADIOL 0.1-0.02MG
1 KIT ORAL DAILY
Qty: 1 PACKET | Refills: 5 | Status: SHIPPED | OUTPATIENT
Start: 2025-06-02

## 2025-06-02 NOTE — TELEPHONE ENCOUNTER
Medication Request/Refill Telephone Documentation:  Medication Requested: LEVONORGEST   Provider last filled by: FILIPE  Last visit: 3/18/25  Last annual/pap smear: 3/18/25  NEXT APPT: N/A-THIS IS MEDICATION PT HAS TAKEN BEFORE

## 2025-06-18 ENCOUNTER — OFFICE VISIT (OUTPATIENT)
Dept: PRIMARY CARE CLINIC | Age: 25
End: 2025-06-18
Payer: COMMERCIAL

## 2025-06-18 VITALS
SYSTOLIC BLOOD PRESSURE: 116 MMHG | DIASTOLIC BLOOD PRESSURE: 62 MMHG | HEART RATE: 78 BPM | RESPIRATION RATE: 16 BRPM | WEIGHT: 128.4 LBS | OXYGEN SATURATION: 98 % | BODY MASS INDEX: 21.37 KG/M2

## 2025-06-18 DIAGNOSIS — B35.1 NAIL FUNGAL INFECTION: ICD-10-CM

## 2025-06-18 DIAGNOSIS — Z00.00 ANNUAL PHYSICAL EXAM: ICD-10-CM

## 2025-06-18 DIAGNOSIS — Z00.00 ENCOUNTER FOR WELL ADULT EXAM WITHOUT ABNORMAL FINDINGS: Primary | ICD-10-CM

## 2025-06-18 PROCEDURE — 99395 PREV VISIT EST AGE 18-39: CPT | Performed by: NURSE PRACTITIONER

## 2025-06-18 RX ORDER — CICLOPIROX OLAMINE 7.7 MG/G
CREAM TOPICAL
Qty: 15 G | Refills: 2 | Status: SHIPPED | OUTPATIENT
Start: 2025-06-18

## 2025-06-18 RX ORDER — TERBINAFINE HYDROCHLORIDE 250 MG/1
250 TABLET ORAL DAILY
Qty: 42 TABLET | Refills: 0 | Status: SHIPPED | OUTPATIENT
Start: 2025-06-18 | End: 2025-07-30

## 2025-06-18 SDOH — ECONOMIC STABILITY: FOOD INSECURITY: WITHIN THE PAST 12 MONTHS, YOU WORRIED THAT YOUR FOOD WOULD RUN OUT BEFORE YOU GOT MONEY TO BUY MORE.: NEVER TRUE

## 2025-06-18 SDOH — ECONOMIC STABILITY: FOOD INSECURITY: WITHIN THE PAST 12 MONTHS, THE FOOD YOU BOUGHT JUST DIDN'T LAST AND YOU DIDN'T HAVE MONEY TO GET MORE.: NEVER TRUE

## 2025-06-18 ASSESSMENT — PATIENT HEALTH QUESTIONNAIRE - PHQ9
3. TROUBLE FALLING OR STAYING ASLEEP: NOT AT ALL
6. FEELING BAD ABOUT YOURSELF - OR THAT YOU ARE A FAILURE OR HAVE LET YOURSELF OR YOUR FAMILY DOWN: NOT AT ALL
10. IF YOU CHECKED OFF ANY PROBLEMS, HOW DIFFICULT HAVE THESE PROBLEMS MADE IT FOR YOU TO DO YOUR WORK, TAKE CARE OF THINGS AT HOME, OR GET ALONG WITH OTHER PEOPLE: NOT DIFFICULT AT ALL
5. POOR APPETITE OR OVEREATING: NOT AT ALL
7. TROUBLE CONCENTRATING ON THINGS, SUCH AS READING THE NEWSPAPER OR WATCHING TELEVISION: NOT AT ALL
8. MOVING OR SPEAKING SO SLOWLY THAT OTHER PEOPLE COULD HAVE NOTICED. OR THE OPPOSITE, BEING SO FIGETY OR RESTLESS THAT YOU HAVE BEEN MOVING AROUND A LOT MORE THAN USUAL: NOT AT ALL
4. FEELING TIRED OR HAVING LITTLE ENERGY: NOT AT ALL
2. FEELING DOWN, DEPRESSED OR HOPELESS: NOT AT ALL
SUM OF ALL RESPONSES TO PHQ QUESTIONS 1-9: 0
9. THOUGHTS THAT YOU WOULD BE BETTER OFF DEAD, OR OF HURTING YOURSELF: NOT AT ALL

## 2025-06-18 ASSESSMENT — ENCOUNTER SYMPTOMS
SHORTNESS OF BREATH: 0
NAUSEA: 0
VOMITING: 0
RHINORRHEA: 0
CHEST TIGHTNESS: 0
ABDOMINAL PAIN: 0
COLOR CHANGE: 0
SORE THROAT: 0
DIARRHEA: 0

## 2025-06-18 NOTE — PROGRESS NOTES
[facility]  [facility] PRIMARY CARE  [address] [place] [address] DR  [address]  [facility] [address]  Dept: [phone]  Dept Fax: [phone]    Venu Ramsey is a 24 y.o. female who presentstoday for her medical conditions/complaints as noted below.  Venu Ramsey is c/o of  Chief Complaint   Patient presents with    Annual Exam         HPI:     History of Present Illness  The patient presents for evaluation of onychomycosis and for annual exam.     She has a history of frequent nail care, which she performs herself, and suspects this may have led to the development of a fungal infection. She reports that the medication has been effective in reducing the symptoms, although at a slow pace. She had some improvement in symptoms with oral terbinafine previously.  Would like to return to that regimen.  We also discussed giving topical to continue if oral does not work.    She is currently under the care of [facility] for birth control management. She reports no new or different headaches or vision changes. She also reports no chest pain, shortness of breath, abdominal pain, changes in bowel or bladder habits, or leg swelling.      No results found for: \"LABA1C\"          ( goal A1C is < 7)   No components found for: \"LABMICR\"  No components found for: \"LDLCHOLESTEROL\", \"LDLCALC\"    (goal LDL is <100)   AST (U/L)   Date Value   06/16/2023 19     ALT (U/L)   Date Value   06/16/2023 16     BUN (mg/dL)   Date Value   06/16/2023 13     BP Readings from Last 3 Encounters:   06/18/25 116/62   05/29/25 106/80   03/18/25 116/68          ([id number])    Past Medical History:   Diagnosis Date    ADHD (attention deficit hyperactivity disorder)     Anxiety     Blood circulation, collateral     Hx of Vaso Vagal Syncopy    Depression     Psychiatric problem     ADHD    Vasovagal syncope       History reviewed. No pertinent surgical history.    Family History   Problem Relation Age of Onset    Diabetes Maternal

## 2025-08-19 ENCOUNTER — E-VISIT (OUTPATIENT)
Dept: OBGYN CLINIC | Age: 25
End: 2025-08-19
Payer: COMMERCIAL

## 2025-08-19 DIAGNOSIS — N89.8 VAGINAL ITCHING: Primary | ICD-10-CM

## 2025-08-19 PROCEDURE — 99421 OL DIG E/M SVC 5-10 MIN: CPT | Performed by: NURSE PRACTITIONER

## 2025-08-19 RX ORDER — MICONAZOLE NITRATE 2 %
CREAM WITH APPLICATOR VAGINAL
Qty: 45 G | Refills: 0 | Status: SHIPPED | OUTPATIENT
Start: 2025-08-19 | End: 2025-08-26